# Patient Record
Sex: MALE | Race: WHITE | NOT HISPANIC OR LATINO | Employment: OTHER | URBAN - METROPOLITAN AREA
[De-identification: names, ages, dates, MRNs, and addresses within clinical notes are randomized per-mention and may not be internally consistent; named-entity substitution may affect disease eponyms.]

---

## 2023-01-01 ENCOUNTER — NURSING HOME VISIT (OUTPATIENT)
Dept: POST ACUTE CARE | Facility: EXTERNAL LOCATION | Age: 88
End: 2023-01-01
Payer: COMMERCIAL

## 2023-01-01 ENCOUNTER — OFFICE VISIT (OUTPATIENT)
Dept: DERMATOLOGY | Facility: CLINIC | Age: 88
End: 2023-01-01
Payer: COMMERCIAL

## 2023-01-01 ENCOUNTER — TELEPHONE (OUTPATIENT)
Dept: DERMATOLOGY | Facility: CLINIC | Age: 88
End: 2023-01-01
Payer: COMMERCIAL

## 2023-01-01 ENCOUNTER — APPOINTMENT (OUTPATIENT)
Dept: VASCULAR MEDICINE | Facility: HOSPITAL | Age: 88
DRG: 175 | End: 2023-01-01
Payer: COMMERCIAL

## 2023-01-01 ENCOUNTER — APPOINTMENT (OUTPATIENT)
Dept: HEMATOLOGY/ONCOLOGY | Facility: CLINIC | Age: 88
End: 2023-01-01
Payer: COMMERCIAL

## 2023-01-01 ENCOUNTER — APPOINTMENT (OUTPATIENT)
Dept: CARDIOLOGY | Facility: HOSPITAL | Age: 88
DRG: 175 | End: 2023-01-01
Payer: COMMERCIAL

## 2023-01-01 ENCOUNTER — HOSPITAL ENCOUNTER (INPATIENT)
Facility: HOSPITAL | Age: 88
LOS: 5 days | Discharge: NURSING, REHAB OR LONG-TERM CARE | DRG: 175 | End: 2023-12-19
Attending: EMERGENCY MEDICINE | Admitting: INTERNAL MEDICINE
Payer: COMMERCIAL

## 2023-01-01 ENCOUNTER — APPOINTMENT (OUTPATIENT)
Dept: RADIOLOGY | Facility: HOSPITAL | Age: 88
DRG: 175 | End: 2023-01-01
Payer: COMMERCIAL

## 2023-01-01 ENCOUNTER — NURSING HOME VISIT (OUTPATIENT)
Dept: POST ACUTE CARE | Facility: EXTERNAL LOCATION | Age: 88
End: 2023-01-01

## 2023-01-01 ENCOUNTER — APPOINTMENT (OUTPATIENT)
Dept: LAB | Facility: HOSPITAL | Age: 88
End: 2023-01-01
Payer: COMMERCIAL

## 2023-01-01 ENCOUNTER — OFFICE VISIT (OUTPATIENT)
Dept: NEPHROLOGY | Facility: CLINIC | Age: 88
End: 2023-01-01
Payer: COMMERCIAL

## 2023-01-01 ENCOUNTER — OFFICE VISIT (OUTPATIENT)
Dept: HEMATOLOGY/ONCOLOGY | Facility: CLINIC | Age: 88
End: 2023-01-01
Payer: COMMERCIAL

## 2023-01-01 VITALS
WEIGHT: 147.2 LBS | TEMPERATURE: 98.8 F | HEART RATE: 69 BPM | RESPIRATION RATE: 20 BRPM | SYSTOLIC BLOOD PRESSURE: 99 MMHG | BODY MASS INDEX: 21.07 KG/M2 | HEIGHT: 70 IN | OXYGEN SATURATION: 92 % | DIASTOLIC BLOOD PRESSURE: 57 MMHG

## 2023-01-01 VITALS
HEART RATE: 62 BPM | OXYGEN SATURATION: 96 % | DIASTOLIC BLOOD PRESSURE: 62 MMHG | SYSTOLIC BLOOD PRESSURE: 120 MMHG | TEMPERATURE: 98.1 F

## 2023-01-01 VITALS
HEIGHT: 67 IN | HEART RATE: 58 BPM | OXYGEN SATURATION: 94 % | DIASTOLIC BLOOD PRESSURE: 68 MMHG | BODY MASS INDEX: 24.33 KG/M2 | SYSTOLIC BLOOD PRESSURE: 146 MMHG | WEIGHT: 155 LBS | RESPIRATION RATE: 18 BRPM

## 2023-01-01 DIAGNOSIS — J44.9 CHRONIC OBSTRUCTIVE PULMONARY DISEASE, UNSPECIFIED COPD TYPE (MULTI): ICD-10-CM

## 2023-01-01 DIAGNOSIS — C43.59 MALIGNANT MELANOMA OF TORSO EXCLUDING BREAST (MULTI): ICD-10-CM

## 2023-01-01 DIAGNOSIS — N18.4 CKD (CHRONIC KIDNEY DISEASE) STAGE 4, GFR 15-29 ML/MIN (MULTI): ICD-10-CM

## 2023-01-01 DIAGNOSIS — L57.0 ACTINIC KERATOSIS: ICD-10-CM

## 2023-01-01 DIAGNOSIS — I26.99 ACUTE PULMONARY EMBOLISM, UNSPECIFIED PULMONARY EMBOLISM TYPE, UNSPECIFIED WHETHER ACUTE COR PULMONALE PRESENT (MULTI): ICD-10-CM

## 2023-01-01 DIAGNOSIS — I10 BENIGN HYPERTENSION: ICD-10-CM

## 2023-01-01 DIAGNOSIS — E46 PROTEIN-CALORIE MALNUTRITION, UNSPECIFIED SEVERITY (MULTI): ICD-10-CM

## 2023-01-01 DIAGNOSIS — E78.5 DYSLIPIDEMIA: ICD-10-CM

## 2023-01-01 DIAGNOSIS — L57.8 SUN-DAMAGED SKIN: ICD-10-CM

## 2023-01-01 DIAGNOSIS — N18.30 STAGE 3 CHRONIC KIDNEY DISEASE, UNSPECIFIED WHETHER STAGE 3A OR 3B CKD (MULTI): Primary | ICD-10-CM

## 2023-01-01 DIAGNOSIS — L82.1 SEBORRHEIC KERATOSIS: ICD-10-CM

## 2023-01-01 DIAGNOSIS — J44.1 CHRONIC OBSTRUCTIVE PULMONARY DISEASE WITH ACUTE EXACERBATION (MULTI): Primary | ICD-10-CM

## 2023-01-01 DIAGNOSIS — G25.81 RLS (RESTLESS LEGS SYNDROME): ICD-10-CM

## 2023-01-01 DIAGNOSIS — R06.02 SHORTNESS OF BREATH: ICD-10-CM

## 2023-01-01 DIAGNOSIS — I26.99 PULMONARY EMBOLISM, OTHER, UNSPECIFIED CHRONICITY, UNSPECIFIED WHETHER ACUTE COR PULMONALE PRESENT (MULTI): ICD-10-CM

## 2023-01-01 DIAGNOSIS — I50.43 ACUTE ON CHRONIC COMBINED SYSTOLIC AND DIASTOLIC CONGESTIVE HEART FAILURE (MULTI): ICD-10-CM

## 2023-01-01 DIAGNOSIS — K92.1 HEMATOCHEZIA: Primary | ICD-10-CM

## 2023-01-01 DIAGNOSIS — J96.90 RESPIRATORY FAILURE, UNSPECIFIED CHRONICITY, UNSPECIFIED WHETHER WITH HYPOXIA OR HYPERCAPNIA (MULTI): ICD-10-CM

## 2023-01-01 DIAGNOSIS — J44.0 CHRONIC OBSTRUCTIVE PULMONARY DISEASE WITH ACUTE LOWER RESPIRATORY INFECTION (MULTI): ICD-10-CM

## 2023-01-01 DIAGNOSIS — N17.0 ACUTE RENAL FAILURE WITH TUBULAR NECROSIS (CMS-HCC): ICD-10-CM

## 2023-01-01 DIAGNOSIS — I50.9 CONGESTIVE HEART FAILURE, UNSPECIFIED HF CHRONICITY, UNSPECIFIED HEART FAILURE TYPE (MULTI): ICD-10-CM

## 2023-01-01 DIAGNOSIS — Z86.39 HISTORY OF TYPE 2 DIABETES MELLITUS: ICD-10-CM

## 2023-01-01 DIAGNOSIS — J44.1 CHRONIC OBSTRUCTIVE PULMONARY DISEASE WITH ACUTE EXACERBATION (MULTI): ICD-10-CM

## 2023-01-01 DIAGNOSIS — R09.02 HYPOXIA: ICD-10-CM

## 2023-01-01 DIAGNOSIS — K21.9 GASTROESOPHAGEAL REFLUX DISEASE, UNSPECIFIED WHETHER ESOPHAGITIS PRESENT: ICD-10-CM

## 2023-01-01 DIAGNOSIS — J18.9 ACUTE PNEUMONIA: ICD-10-CM

## 2023-01-01 DIAGNOSIS — I89.0 LYMPHEDEMA: ICD-10-CM

## 2023-01-01 DIAGNOSIS — J18.9 HCAP (HEALTHCARE-ASSOCIATED PNEUMONIA): ICD-10-CM

## 2023-01-01 DIAGNOSIS — I50.20 SYSTOLIC CONGESTIVE HEART FAILURE, UNSPECIFIED HF CHRONICITY (MULTI): ICD-10-CM

## 2023-01-01 DIAGNOSIS — I50.9 HEART FAILURE, UNSPECIFIED HF CHRONICITY, UNSPECIFIED HEART FAILURE TYPE (MULTI): ICD-10-CM

## 2023-01-01 DIAGNOSIS — I50.41 ACUTE COMBINED SYSTOLIC (CONGESTIVE) AND DIASTOLIC (CONGESTIVE) HEART FAILURE (MULTI): ICD-10-CM

## 2023-01-01 DIAGNOSIS — M79.89 LEG SWELLING: ICD-10-CM

## 2023-01-01 DIAGNOSIS — D22.9 BENIGN NEVUS: ICD-10-CM

## 2023-01-01 DIAGNOSIS — C43.59 MALIGNANT MELANOMA OF TORSO EXCLUDING BREAST (MULTI): Primary | ICD-10-CM

## 2023-01-01 DIAGNOSIS — C43.9 MALIGNANT MELANOMA, UNSPECIFIED SITE (MULTI): ICD-10-CM

## 2023-01-01 DIAGNOSIS — D48.5 NEOPLASM OF UNCERTAIN BEHAVIOR OF SKIN: Primary | ICD-10-CM

## 2023-01-01 DIAGNOSIS — D18.01 ANGIOMA OF SKIN: ICD-10-CM

## 2023-01-01 DIAGNOSIS — Z85.820 PERSONAL HISTORY OF MALIGNANT MELANOMA OF SKIN: ICD-10-CM

## 2023-01-01 DIAGNOSIS — Z86.79 HISTORY OF HEART FAILURE: Primary | ICD-10-CM

## 2023-01-01 DIAGNOSIS — R63.4 WEIGHT LOSS: ICD-10-CM

## 2023-01-01 DIAGNOSIS — I50.9 CHRONIC HEART FAILURE, UNSPECIFIED HEART FAILURE TYPE (MULTI): ICD-10-CM

## 2023-01-01 LAB
ALBUMIN SERPL BCP-MCNC: 3.8 G/DL (ref 3.4–5)
ALP SERPL-CCNC: 68 U/L (ref 33–136)
ALT SERPL W P-5'-P-CCNC: 7 U/L (ref 10–52)
ANION GAP BLDV CALCULATED.4IONS-SCNC: 3 MMOL/L (ref 10–25)
ANION GAP SERPL CALC-SCNC: 10 MMOL/L (ref 10–20)
ANION GAP SERPL CALC-SCNC: 10 MMOL/L (ref 10–20)
ANION GAP SERPL CALC-SCNC: 11 MMOL/L (ref 10–20)
ANION GAP SERPL CALC-SCNC: 8 MMOL/L (ref 10–20)
ANION GAP SERPL CALC-SCNC: 9 MMOL/L (ref 10–20)
ANION GAP SERPL CALC-SCNC: 9 MMOL/L (ref 10–20)
AORTIC VALVE MEAN GRADIENT: 15.9
AORTIC VALVE PEAK VELOCITY: 2.63
APPEARANCE UR: CLEAR
AST SERPL W P-5'-P-CCNC: 14 U/L (ref 9–39)
AV PEAK GRADIENT: 27.7
AVA (PEAK VEL): 1.04
AVA (VTI): 1.21
BACTERIA #/AREA URNS AUTO: ABNORMAL /HPF
BACTERIA BLD CULT: NORMAL
BACTERIA BLD CULT: NORMAL
BACTERIA SPEC RESP CULT: NORMAL
BASE EXCESS BLDV CALC-SCNC: 6.7 MMOL/L (ref -2–3)
BASOPHILS # BLD AUTO: 0 X10*3/UL (ref 0–0.1)
BASOPHILS # BLD AUTO: 0 X10*3/UL (ref 0–0.1)
BASOPHILS # BLD AUTO: 0.01 X10*3/UL (ref 0–0.1)
BASOPHILS # BLD AUTO: 0.01 X10*3/UL (ref 0–0.1)
BASOPHILS # BLD AUTO: 0.03 X10*3/UL (ref 0–0.1)
BASOPHILS NFR BLD AUTO: 0 %
BASOPHILS NFR BLD AUTO: 0 %
BASOPHILS NFR BLD AUTO: 0.1 %
BASOPHILS NFR BLD AUTO: 0.1 %
BASOPHILS NFR BLD AUTO: 0.4 %
BILIRUB SERPL-MCNC: 0.6 MG/DL (ref 0–1.2)
BILIRUB UR STRIP.AUTO-MCNC: NEGATIVE MG/DL
BNP SERPL-MCNC: 845 PG/ML (ref 0–99)
BNP SERPL-MCNC: 915 PG/ML (ref 0–99)
BODY TEMPERATURE: ABNORMAL
BUN SERPL-MCNC: 36 MG/DL (ref 6–23)
BUN SERPL-MCNC: 36 MG/DL (ref 6–23)
BUN SERPL-MCNC: 45 MG/DL (ref 6–23)
BUN SERPL-MCNC: 48 MG/DL (ref 6–23)
BUN SERPL-MCNC: 50 MG/DL (ref 6–23)
BUN SERPL-MCNC: 52 MG/DL (ref 6–23)
CA-I BLDV-SCNC: 1.14 MMOL/L (ref 1.1–1.33)
CALCIUM SERPL-MCNC: 8.6 MG/DL (ref 8.6–10.3)
CALCIUM SERPL-MCNC: 8.7 MG/DL (ref 8.6–10.3)
CARDIAC TROPONIN I PNL SERPL HS: 46 NG/L (ref 0–20)
CARDIAC TROPONIN I PNL SERPL HS: 49 NG/L (ref 0–20)
CHLORIDE BLDV-SCNC: 108 MMOL/L (ref 98–107)
CHLORIDE SERPL-SCNC: 102 MMOL/L (ref 98–107)
CHLORIDE SERPL-SCNC: 102 MMOL/L (ref 98–107)
CHLORIDE SERPL-SCNC: 103 MMOL/L (ref 98–107)
CHLORIDE SERPL-SCNC: 105 MMOL/L (ref 98–107)
CHLORIDE SERPL-SCNC: 106 MMOL/L (ref 98–107)
CHLORIDE SERPL-SCNC: 97 MMOL/L (ref 98–107)
CO2 SERPL-SCNC: 31 MMOL/L (ref 21–32)
CO2 SERPL-SCNC: 33 MMOL/L (ref 21–32)
CO2 SERPL-SCNC: 33 MMOL/L (ref 21–32)
CO2 SERPL-SCNC: 34 MMOL/L (ref 21–32)
CO2 SERPL-SCNC: 36 MMOL/L (ref 21–32)
CO2 SERPL-SCNC: 39 MMOL/L (ref 21–32)
COLOR UR: YELLOW
CREAT SERPL-MCNC: 1.33 MG/DL (ref 0.5–1.3)
CREAT SERPL-MCNC: 1.61 MG/DL (ref 0.5–1.3)
CREAT SERPL-MCNC: 1.63 MG/DL (ref 0.5–1.3)
CREAT SERPL-MCNC: 1.75 MG/DL (ref 0.5–1.3)
CREAT SERPL-MCNC: 1.94 MG/DL (ref 0.5–1.3)
CREAT SERPL-MCNC: 1.96 MG/DL (ref 0.5–1.3)
D DIMER PPP FEU-MCNC: 1466 NG/ML FEU
EJECTION FRACTION APICAL 4 CHAMBER: 51.4
EJECTION FRACTION: 54
EOSINOPHIL # BLD AUTO: 0 X10*3/UL (ref 0–0.4)
EOSINOPHIL # BLD AUTO: 0.01 X10*3/UL (ref 0–0.4)
EOSINOPHIL # BLD AUTO: 0.11 X10*3/UL (ref 0–0.4)
EOSINOPHIL NFR BLD AUTO: 0 %
EOSINOPHIL NFR BLD AUTO: 0.1 %
EOSINOPHIL NFR BLD AUTO: 1.6 %
ERYTHROCYTE [DISTWIDTH] IN BLOOD BY AUTOMATED COUNT: 14 % (ref 11.5–14.5)
ERYTHROCYTE [DISTWIDTH] IN BLOOD BY AUTOMATED COUNT: 14.6 % (ref 11.5–14.5)
ERYTHROCYTE [DISTWIDTH] IN BLOOD BY AUTOMATED COUNT: 14.7 % (ref 11.5–14.5)
ERYTHROCYTE [DISTWIDTH] IN BLOOD BY AUTOMATED COUNT: 14.8 % (ref 11.5–14.5)
FERRITIN SERPL-MCNC: 47 NG/ML (ref 20–300)
FLUAV RNA RESP QL NAA+PROBE: NOT DETECTED
FLUBV RNA RESP QL NAA+PROBE: NOT DETECTED
GFR SERPL CREATININE-BSD FRML MDRD: 31 ML/MIN/1.73M*2
GFR SERPL CREATININE-BSD FRML MDRD: 31 ML/MIN/1.73M*2
GFR SERPL CREATININE-BSD FRML MDRD: 35 ML/MIN/1.73M*2
GFR SERPL CREATININE-BSD FRML MDRD: 39 ML/MIN/1.73M*2
GFR SERPL CREATININE-BSD FRML MDRD: 39 ML/MIN/1.73M*2
GFR SERPL CREATININE-BSD FRML MDRD: 49 ML/MIN/1.73M*2
GLUCOSE BLDV-MCNC: 143 MG/DL (ref 74–99)
GLUCOSE SERPL-MCNC: 109 MG/DL (ref 74–99)
GLUCOSE SERPL-MCNC: 133 MG/DL (ref 74–99)
GLUCOSE SERPL-MCNC: 149 MG/DL (ref 74–99)
GLUCOSE SERPL-MCNC: 171 MG/DL (ref 74–99)
GLUCOSE SERPL-MCNC: 180 MG/DL (ref 74–99)
GLUCOSE SERPL-MCNC: 181 MG/DL (ref 74–99)
GLUCOSE UR STRIP.AUTO-MCNC: NEGATIVE MG/DL
GRAM STN SPEC: NORMAL
GRAM STN SPEC: NORMAL
HCO3 BLDV-SCNC: 32.8 MMOL/L (ref 22–26)
HCT VFR BLD AUTO: 33.7 % (ref 41–52)
HCT VFR BLD AUTO: 35 % (ref 41–52)
HCT VFR BLD AUTO: 37 % (ref 41–52)
HCT VFR BLD AUTO: 38.1 % (ref 41–52)
HCT VFR BLD AUTO: 38.2 % (ref 41–52)
HCT VFR BLD AUTO: 39.3 % (ref 41–52)
HCT VFR BLD AUTO: 41.1 % (ref 41–52)
HCT VFR BLD EST: 34 % (ref 41–52)
HGB BLD-MCNC: 10.5 G/DL (ref 13.5–17.5)
HGB BLD-MCNC: 11.1 G/DL (ref 13.5–17.5)
HGB BLD-MCNC: 11.2 G/DL (ref 13.5–17.5)
HGB BLD-MCNC: 11.6 G/DL (ref 13.5–17.5)
HGB BLD-MCNC: 11.6 G/DL (ref 13.5–17.5)
HGB BLD-MCNC: 12.1 G/DL (ref 13.5–17.5)
HGB BLD-MCNC: 9.9 G/DL (ref 13.5–17.5)
HGB BLDV-MCNC: 11.2 G/DL (ref 13.5–17.5)
HOLD SPECIMEN: NORMAL
HOLD SPECIMEN: NORMAL
IMM GRANULOCYTES # BLD AUTO: 0 X10*3/UL (ref 0–0.5)
IMM GRANULOCYTES # BLD AUTO: 0.02 X10*3/UL (ref 0–0.5)
IMM GRANULOCYTES # BLD AUTO: 0.03 X10*3/UL (ref 0–0.5)
IMM GRANULOCYTES NFR BLD AUTO: 0 % (ref 0–0.9)
IMM GRANULOCYTES NFR BLD AUTO: 0.3 % (ref 0–0.9)
IMM GRANULOCYTES NFR BLD AUTO: 0.3 % (ref 0–0.9)
IMM GRANULOCYTES NFR BLD AUTO: 0.4 % (ref 0–0.9)
IMM GRANULOCYTES NFR BLD AUTO: 0.5 % (ref 0–0.9)
INHALED O2 CONCENTRATION: 21 %
IRON SATN MFR SERPL: 20 % (ref 25–45)
IRON SATN MFR SERPL: 5 % (ref 25–45)
IRON SERPL-MCNC: 14 UG/DL (ref 35–150)
IRON SERPL-MCNC: 58 UG/DL (ref 35–150)
KETONES UR STRIP.AUTO-MCNC: NEGATIVE MG/DL
LABORATORY COMMENT REPORT: NORMAL
LACTATE BLDV-SCNC: 1.1 MMOL/L (ref 0.4–2)
LACTATE SERPL-SCNC: 0.9 MMOL/L (ref 0.4–2)
LEFT ATRIUM VOLUME AREA LENGTH INDEX BSA: 48.1
LEFT VENTRICLE INTERNAL DIMENSION DIASTOLE: 4.46 (ref 3.5–6)
LEFT VENTRICULAR OUTFLOW TRACT DIAMETER: 1.98
LEUKOCYTE ESTERASE UR QL STRIP.AUTO: NEGATIVE
LYMPHOCYTES # BLD AUTO: 1 X10*3/UL (ref 0.8–3)
LYMPHOCYTES # BLD AUTO: 1.84 X10*3/UL (ref 0.8–3)
LYMPHOCYTES # BLD AUTO: 2.14 X10*3/UL (ref 0.8–3)
LYMPHOCYTES # BLD AUTO: 2.79 X10*3/UL (ref 0.8–3)
LYMPHOCYTES # BLD AUTO: 2.96 X10*3/UL (ref 0.8–3)
LYMPHOCYTES NFR BLD AUTO: 24.9 %
LYMPHOCYTES NFR BLD AUTO: 26.9 %
LYMPHOCYTES NFR BLD AUTO: 28.4 %
LYMPHOCYTES NFR BLD AUTO: 40.6 %
LYMPHOCYTES NFR BLD AUTO: 40.7 %
MAGNESIUM SERPL-MCNC: 2.21 MG/DL (ref 1.6–2.4)
MAGNESIUM SERPL-MCNC: 2.3 MG/DL (ref 1.6–2.4)
MAGNESIUM SERPL-MCNC: 2.3 MG/DL (ref 1.6–2.4)
MCH RBC QN AUTO: 26.6 PG (ref 26–34)
MCH RBC QN AUTO: 26.9 PG (ref 26–34)
MCH RBC QN AUTO: 27.1 PG (ref 26–34)
MCH RBC QN AUTO: 27.3 PG (ref 26–34)
MCH RBC QN AUTO: 27.6 PG (ref 26–34)
MCH RBC QN AUTO: 27.7 PG (ref 26–34)
MCH RBC QN AUTO: 27.8 PG (ref 26–34)
MCHC RBC AUTO-ENTMCNC: 29.4 G/DL (ref 32–36)
MCHC RBC AUTO-ENTMCNC: 29.5 G/DL (ref 32–36)
MCHC RBC AUTO-ENTMCNC: 30 G/DL (ref 32–36)
MCHC RBC AUTO-ENTMCNC: 30 G/DL (ref 32–36)
MCHC RBC AUTO-ENTMCNC: 30.4 G/DL (ref 32–36)
MCV RBC AUTO: 89 FL (ref 80–100)
MCV RBC AUTO: 90 FL (ref 80–100)
MCV RBC AUTO: 90 FL (ref 80–100)
MCV RBC AUTO: 92 FL (ref 80–100)
MCV RBC AUTO: 93 FL (ref 80–100)
MCV RBC AUTO: 94 FL (ref 80–100)
MCV RBC AUTO: 95 FL (ref 80–100)
MITRAL VALVE E/A RATIO: 1.07
MITRAL VALVE E/E' RATIO: 18.44
MONOCYTES # BLD AUTO: 0.07 X10*3/UL (ref 0.05–0.8)
MONOCYTES # BLD AUTO: 0.44 X10*3/UL (ref 0.05–0.8)
MONOCYTES # BLD AUTO: 0.51 X10*3/UL (ref 0.05–0.8)
MONOCYTES # BLD AUTO: 0.53 X10*3/UL (ref 0.05–0.8)
MONOCYTES # BLD AUTO: 0.6 X10*3/UL (ref 0.05–0.8)
MONOCYTES NFR BLD AUTO: 1.7 %
MONOCYTES NFR BLD AUTO: 5.5 %
MONOCYTES NFR BLD AUTO: 7.3 %
MONOCYTES NFR BLD AUTO: 7.4 %
MONOCYTES NFR BLD AUTO: 9.3 %
MRSA DNA SPEC QL NAA+PROBE: NOT DETECTED
NEUTROPHILS # BLD AUTO: 2.92 X10*3/UL (ref 1.6–5.5)
NEUTROPHILS # BLD AUTO: 3.44 X10*3/UL (ref 1.6–5.5)
NEUTROPHILS # BLD AUTO: 3.75 X10*3/UL (ref 1.6–5.5)
NEUTROPHILS # BLD AUTO: 4.01 X10*3/UL (ref 1.6–5.5)
NEUTROPHILS # BLD AUTO: 5.35 X10*3/UL (ref 1.6–5.5)
NEUTROPHILS NFR BLD AUTO: 50 %
NEUTROPHILS NFR BLD AUTO: 51.5 %
NEUTROPHILS NFR BLD AUTO: 62 %
NEUTROPHILS NFR BLD AUTO: 67.1 %
NEUTROPHILS NFR BLD AUTO: 72.9 %
NITRITE UR QL STRIP.AUTO: NEGATIVE
NRBC BLD-RTO: 0 /100 WBCS (ref 0–0)
NRBC BLD-RTO: 0.3 /100 WBCS (ref 0–0)
OXYHGB MFR BLDV: 59.5 % (ref 45–75)
PATH REPORT.FINAL DX SPEC: NORMAL
PATH REPORT.GROSS SPEC: NORMAL
PATH REPORT.MICROSCOPIC SPEC OTHER STN: NORMAL
PATH REPORT.RELEVANT HX SPEC: NORMAL
PATH REPORT.TOTAL CANCER: NORMAL
PCO2 BLDV: 53 MM HG (ref 41–51)
PH BLDV: 7.4 PH (ref 7.33–7.43)
PH UR STRIP.AUTO: 5 [PH]
PLATELET # BLD AUTO: 105 X10*3/UL (ref 150–450)
PLATELET # BLD AUTO: 75 X10*3/UL (ref 150–450)
PLATELET # BLD AUTO: 76 X10*3/UL (ref 150–450)
PLATELET # BLD AUTO: 83 X10*3/UL (ref 150–450)
PLATELET # BLD AUTO: 97 X10*3/UL (ref 150–450)
PLATELET # BLD AUTO: 97 X10*3/UL (ref 150–450)
PLATELET # BLD AUTO: 99 X10*3/UL (ref 150–450)
PO2 BLDV: 39 MM HG (ref 35–45)
POTASSIUM BLDV-SCNC: 4.7 MMOL/L (ref 3.5–5.3)
POTASSIUM SERPL-SCNC: 4.2 MMOL/L (ref 3.5–5.3)
POTASSIUM SERPL-SCNC: 4.3 MMOL/L (ref 3.5–5.3)
POTASSIUM SERPL-SCNC: 4.6 MMOL/L (ref 3.5–5.3)
POTASSIUM SERPL-SCNC: 4.8 MMOL/L (ref 3.5–5.3)
POTASSIUM SERPL-SCNC: 4.8 MMOL/L (ref 3.5–5.3)
POTASSIUM SERPL-SCNC: 4.9 MMOL/L (ref 3.5–5.3)
PROCALCITONIN SERPL-MCNC: 0.31 NG/ML
PROT SERPL-MCNC: 6.1 G/DL (ref 6.4–8.2)
PROT UR STRIP.AUTO-MCNC: ABNORMAL MG/DL
RBC # BLD AUTO: 3.59 X10*6/UL (ref 4.5–5.9)
RBC # BLD AUTO: 3.79 X10*6/UL (ref 4.5–5.9)
RBC # BLD AUTO: 4.1 X10*6/UL (ref 4.5–5.9)
RBC # BLD AUTO: 4.1 X10*6/UL (ref 4.5–5.9)
RBC # BLD AUTO: 4.31 X10*6/UL (ref 4.5–5.9)
RBC # BLD AUTO: 4.35 X10*6/UL (ref 4.5–5.9)
RBC # BLD AUTO: 4.36 X10*6/UL (ref 4.5–5.9)
RBC # UR STRIP.AUTO: NEGATIVE /UL
RBC #/AREA URNS AUTO: ABNORMAL /HPF
RIGHT VENTRICLE FREE WALL PEAK S': 9
RIGHT VENTRICLE PEAK SYSTOLIC PRESSURE: 46.6
SAO2 % BLDV: 61 % (ref 45–75)
SARS-COV-2 RNA RESP QL NAA+PROBE: NOT DETECTED
SODIUM BLDV-SCNC: 139 MMOL/L (ref 136–145)
SODIUM SERPL-SCNC: 140 MMOL/L (ref 136–145)
SODIUM SERPL-SCNC: 141 MMOL/L (ref 136–145)
SODIUM SERPL-SCNC: 142 MMOL/L (ref 136–145)
SODIUM SERPL-SCNC: 143 MMOL/L (ref 136–145)
SP GR UR STRIP.AUTO: 1.02
STAPHYLOCOCCUS SPEC CULT: NORMAL
TIBC SERPL-MCNC: 264 UG/DL (ref 240–445)
TIBC SERPL-MCNC: 293 UG/DL (ref 240–445)
TRICUSPID ANNULAR PLANE SYSTOLIC EXCURSION: 1.9
UFH PPP CHRO-ACNC: 0.2 IU/ML
UFH PPP CHRO-ACNC: 0.3 IU/ML
UFH PPP CHRO-ACNC: 0.4 IU/ML
UIBC SERPL-MCNC: 235 UG/DL (ref 110–370)
UIBC SERPL-MCNC: 250 UG/DL (ref 110–370)
UROBILINOGEN UR STRIP.AUTO-MCNC: <2 MG/DL
VIT B12 SERPL-MCNC: 391 PG/ML (ref 211–911)
WBC # BLD AUTO: 4 X10*3/UL (ref 4.4–11.3)
WBC # BLD AUTO: 6.5 X10*3/UL (ref 4.4–11.3)
WBC # BLD AUTO: 6.9 X10*3/UL (ref 4.4–11.3)
WBC # BLD AUTO: 7.3 X10*3/UL (ref 4.4–11.3)
WBC # BLD AUTO: 7.3 X10*3/UL (ref 4.4–11.3)
WBC # BLD AUTO: 7.9 X10*3/UL (ref 4.4–11.3)
WBC # BLD AUTO: 8 X10*3/UL (ref 4.4–11.3)
WBC #/AREA URNS AUTO: ABNORMAL /HPF

## 2023-01-01 PROCEDURE — 83880 ASSAY OF NATRIURETIC PEPTIDE: CPT | Performed by: INTERNAL MEDICINE

## 2023-01-01 PROCEDURE — 36415 COLL VENOUS BLD VENIPUNCTURE: CPT | Performed by: INTERNAL MEDICINE

## 2023-01-01 PROCEDURE — 36415 COLL VENOUS BLD VENIPUNCTURE: CPT | Performed by: NURSE PRACTITIONER

## 2023-01-01 PROCEDURE — 1126F AMNT PAIN NOTED NONE PRSNT: CPT | Performed by: INTERNAL MEDICINE

## 2023-01-01 PROCEDURE — 99308 SBSQ NF CARE LOW MDM 20: CPT | Performed by: FAMILY MEDICINE

## 2023-01-01 PROCEDURE — 83540 ASSAY OF IRON: CPT | Performed by: INTERNAL MEDICINE

## 2023-01-01 PROCEDURE — 1210000001 HC SEMI-PRIVATE ROOM DAILY

## 2023-01-01 PROCEDURE — 99309 SBSQ NF CARE MODERATE MDM 30: CPT | Performed by: FAMILY MEDICINE

## 2023-01-01 PROCEDURE — 71045 X-RAY EXAM CHEST 1 VIEW: CPT

## 2023-01-01 PROCEDURE — 2500000004 HC RX 250 GENERAL PHARMACY W/ HCPCS (ALT 636 FOR OP/ED): Performed by: INTERNAL MEDICINE

## 2023-01-01 PROCEDURE — 3078F DIAST BP <80 MM HG: CPT | Performed by: NURSE PRACTITIONER

## 2023-01-01 PROCEDURE — 94660 CPAP INITIATION&MGMT: CPT

## 2023-01-01 PROCEDURE — 96367 TX/PROPH/DG ADDL SEQ IV INF: CPT

## 2023-01-01 PROCEDURE — 96365 THER/PROPH/DIAG IV INF INIT: CPT | Mod: 59

## 2023-01-01 PROCEDURE — 85520 HEPARIN ASSAY: CPT | Performed by: INTERNAL MEDICINE

## 2023-01-01 PROCEDURE — 80048 BASIC METABOLIC PNL TOTAL CA: CPT | Performed by: INTERNAL MEDICINE

## 2023-01-01 PROCEDURE — 83880 ASSAY OF NATRIURETIC PEPTIDE: CPT | Performed by: EMERGENCY MEDICINE

## 2023-01-01 PROCEDURE — 2500000001 HC RX 250 WO HCPCS SELF ADMINISTERED DRUGS (ALT 637 FOR MEDICARE OP): Performed by: INTERNAL MEDICINE

## 2023-01-01 PROCEDURE — 2500000004 HC RX 250 GENERAL PHARMACY W/ HCPCS (ALT 636 FOR OP/ED): Performed by: NURSE PRACTITIONER

## 2023-01-01 PROCEDURE — 2500000002 HC RX 250 W HCPCS SELF ADMINISTERED DRUGS (ALT 637 FOR MEDICARE OP, ALT 636 FOR OP/ED): Performed by: INTERNAL MEDICINE

## 2023-01-01 PROCEDURE — 99239 HOSP IP/OBS DSCHRG MGMT >30: CPT | Performed by: FAMILY MEDICINE

## 2023-01-01 PROCEDURE — A9540 TC99M MAA: HCPCS | Performed by: INTERNAL MEDICINE

## 2023-01-01 PROCEDURE — 85025 COMPLETE CBC W/AUTO DIFF WBC: CPT | Performed by: INTERNAL MEDICINE

## 2023-01-01 PROCEDURE — 1126F AMNT PAIN NOTED NONE PRSNT: CPT | Performed by: NURSE PRACTITIONER

## 2023-01-01 PROCEDURE — 85027 COMPLETE CBC AUTOMATED: CPT | Performed by: NURSE PRACTITIONER

## 2023-01-01 PROCEDURE — 94668 MNPJ CHEST WALL SBSQ: CPT

## 2023-01-01 PROCEDURE — 94640 AIRWAY INHALATION TREATMENT: CPT

## 2023-01-01 PROCEDURE — 84155 ASSAY OF PROTEIN SERUM: CPT | Performed by: NURSE PRACTITIONER

## 2023-01-01 PROCEDURE — 85025 COMPLETE CBC W/AUTO DIFF WBC: CPT | Performed by: EMERGENCY MEDICINE

## 2023-01-01 PROCEDURE — 99233 SBSQ HOSP IP/OBS HIGH 50: CPT | Performed by: INTERNAL MEDICINE

## 2023-01-01 PROCEDURE — 82607 VITAMIN B-12: CPT | Performed by: NURSE PRACTITIONER

## 2023-01-01 PROCEDURE — 85025 COMPLETE CBC W/AUTO DIFF WBC: CPT | Performed by: NURSE PRACTITIONER

## 2023-01-01 PROCEDURE — 2500000002 HC RX 250 W HCPCS SELF ADMINISTERED DRUGS (ALT 637 FOR MEDICARE OP, ALT 636 FOR OP/ED): Performed by: EMERGENCY MEDICINE

## 2023-01-01 PROCEDURE — 93005 ELECTROCARDIOGRAM TRACING: CPT

## 2023-01-01 PROCEDURE — 84484 ASSAY OF TROPONIN QUANT: CPT | Performed by: EMERGENCY MEDICINE

## 2023-01-01 PROCEDURE — 11102 TANGNTL BX SKIN SINGLE LES: CPT | Performed by: NURSE PRACTITIONER

## 2023-01-01 PROCEDURE — 1100000001 HC PRIVATE ROOM DAILY

## 2023-01-01 PROCEDURE — 85520 HEPARIN ASSAY: CPT | Performed by: NURSE PRACTITIONER

## 2023-01-01 PROCEDURE — 99309 SBSQ NF CARE MODERATE MDM 30: CPT | Performed by: NURSE PRACTITIONER

## 2023-01-01 PROCEDURE — 82728 ASSAY OF FERRITIN: CPT | Performed by: NURSE PRACTITIONER

## 2023-01-01 PROCEDURE — 87205 SMEAR GRAM STAIN: CPT | Mod: GEALAB | Performed by: INTERNAL MEDICINE

## 2023-01-01 PROCEDURE — 99214 OFFICE O/P EST MOD 30 MIN: CPT | Mod: 25 | Performed by: NURSE PRACTITIONER

## 2023-01-01 PROCEDURE — 99223 1ST HOSP IP/OBS HIGH 75: CPT | Performed by: INTERNAL MEDICINE

## 2023-01-01 PROCEDURE — 83735 ASSAY OF MAGNESIUM: CPT | Performed by: INTERNAL MEDICINE

## 2023-01-01 PROCEDURE — 1159F MED LIST DOCD IN RCRD: CPT | Performed by: INTERNAL MEDICINE

## 2023-01-01 PROCEDURE — 99305 1ST NF CARE MODERATE MDM 35: CPT | Performed by: FAMILY MEDICINE

## 2023-01-01 PROCEDURE — 2500000004 HC RX 250 GENERAL PHARMACY W/ HCPCS (ALT 636 FOR OP/ED): Performed by: EMERGENCY MEDICINE

## 2023-01-01 PROCEDURE — 2500000005 HC RX 250 GENERAL PHARMACY W/O HCPCS: Performed by: INTERNAL MEDICINE

## 2023-01-01 PROCEDURE — 99213 OFFICE O/P EST LOW 20 MIN: CPT | Performed by: NURSE PRACTITIONER

## 2023-01-01 PROCEDURE — 78580 LUNG PERFUSION IMAGING: CPT

## 2023-01-01 PROCEDURE — 78830 RP LOCLZJ TUM SPECT W/CT 1: CPT | Performed by: RADIOLOGY

## 2023-01-01 PROCEDURE — 71045 X-RAY EXAM CHEST 1 VIEW: CPT | Mod: FY

## 2023-01-01 PROCEDURE — 84145 PROCALCITONIN (PCT): CPT | Mod: GEALAB | Performed by: INTERNAL MEDICINE

## 2023-01-01 PROCEDURE — 36415 COLL VENOUS BLD VENIPUNCTURE: CPT | Performed by: EMERGENCY MEDICINE

## 2023-01-01 PROCEDURE — 93306 TTE W/DOPPLER COMPLETE: CPT | Performed by: INTERNAL MEDICINE

## 2023-01-01 PROCEDURE — 81001 URINALYSIS AUTO W/SCOPE: CPT | Performed by: EMERGENCY MEDICINE

## 2023-01-01 PROCEDURE — 94760 N-INVAS EAR/PLS OXIMETRY 1: CPT

## 2023-01-01 PROCEDURE — 84132 ASSAY OF SERUM POTASSIUM: CPT | Performed by: EMERGENCY MEDICINE

## 2023-01-01 PROCEDURE — 2500000005 HC RX 250 GENERAL PHARMACY W/O HCPCS: Performed by: EMERGENCY MEDICINE

## 2023-01-01 PROCEDURE — 3430000001 HC RX 343 DIAGNOSTIC RADIOPHARMACEUTICALS: Performed by: INTERNAL MEDICINE

## 2023-01-01 PROCEDURE — 94667 MNPJ CHEST WALL 1ST: CPT

## 2023-01-01 PROCEDURE — 3077F SYST BP >= 140 MM HG: CPT | Performed by: INTERNAL MEDICINE

## 2023-01-01 PROCEDURE — 99285 EMERGENCY DEPT VISIT HI MDM: CPT | Performed by: EMERGENCY MEDICINE

## 2023-01-01 PROCEDURE — 1159F MED LIST DOCD IN RCRD: CPT | Performed by: NURSE PRACTITIONER

## 2023-01-01 PROCEDURE — 93306 TTE W/DOPPLER COMPLETE: CPT

## 2023-01-01 PROCEDURE — 11103 TANGNTL BX SKIN EA SEP/ADDL: CPT | Performed by: NURSE PRACTITIONER

## 2023-01-01 PROCEDURE — 99214 OFFICE O/P EST MOD 30 MIN: CPT | Performed by: NURSE PRACTITIONER

## 2023-01-01 PROCEDURE — 87641 MR-STAPH DNA AMP PROBE: CPT | Performed by: EMERGENCY MEDICINE

## 2023-01-01 PROCEDURE — 87636 SARSCOV2 & INF A&B AMP PRB: CPT | Performed by: EMERGENCY MEDICINE

## 2023-01-01 PROCEDURE — 96361 HYDRATE IV INFUSION ADD-ON: CPT

## 2023-01-01 PROCEDURE — 87081 CULTURE SCREEN ONLY: CPT | Mod: GEALAB | Performed by: INTERNAL MEDICINE

## 2023-01-01 PROCEDURE — 83540 ASSAY OF IRON: CPT | Performed by: NURSE PRACTITIONER

## 2023-01-01 PROCEDURE — 99215 OFFICE O/P EST HI 40 MIN: CPT | Performed by: INTERNAL MEDICINE

## 2023-01-01 PROCEDURE — 94664 DEMO&/EVAL PT USE INHALER: CPT

## 2023-01-01 PROCEDURE — 3074F SYST BP LT 130 MM HG: CPT | Performed by: NURSE PRACTITIONER

## 2023-01-01 PROCEDURE — 83605 ASSAY OF LACTIC ACID: CPT | Performed by: EMERGENCY MEDICINE

## 2023-01-01 PROCEDURE — 96375 TX/PRO/DX INJ NEW DRUG ADDON: CPT

## 2023-01-01 PROCEDURE — 82330 ASSAY OF CALCIUM: CPT | Performed by: EMERGENCY MEDICINE

## 2023-01-01 PROCEDURE — 71045 X-RAY EXAM CHEST 1 VIEW: CPT | Performed by: RADIOLOGY

## 2023-01-01 PROCEDURE — 85379 FIBRIN DEGRADATION QUANT: CPT | Performed by: INTERNAL MEDICINE

## 2023-01-01 PROCEDURE — 99232 SBSQ HOSP IP/OBS MODERATE 35: CPT | Performed by: INTERNAL MEDICINE

## 2023-01-01 PROCEDURE — 3078F DIAST BP <80 MM HG: CPT | Performed by: INTERNAL MEDICINE

## 2023-01-01 PROCEDURE — 93970 EXTREMITY STUDY: CPT | Performed by: INTERNAL MEDICINE

## 2023-01-01 PROCEDURE — 96372 THER/PROPH/DIAG INJ SC/IM: CPT | Performed by: INTERNAL MEDICINE

## 2023-01-01 PROCEDURE — 99232 SBSQ HOSP IP/OBS MODERATE 35: CPT | Performed by: NURSE PRACTITIONER

## 2023-01-01 PROCEDURE — 88305 TISSUE EXAM BY PATHOLOGIST: CPT | Performed by: DERMATOLOGY

## 2023-01-01 PROCEDURE — 93970 EXTREMITY STUDY: CPT

## 2023-01-01 PROCEDURE — 82435 ASSAY OF BLOOD CHLORIDE: CPT | Performed by: INTERNAL MEDICINE

## 2023-01-01 PROCEDURE — 88305 TISSUE EXAM BY PATHOLOGIST: CPT | Mod: TC,DER | Performed by: NURSE PRACTITIONER

## 2023-01-01 PROCEDURE — 99310 SBSQ NF CARE HIGH MDM 45: CPT | Performed by: NURSE PRACTITIONER

## 2023-01-01 PROCEDURE — 99310 SBSQ NF CARE HIGH MDM 45: CPT | Performed by: FAMILY MEDICINE

## 2023-01-01 PROCEDURE — 87040 BLOOD CULTURE FOR BACTERIA: CPT | Mod: GEALAB | Performed by: EMERGENCY MEDICINE

## 2023-01-01 RX ORDER — IBUPROFEN 200 MG
TABLET ORAL
COMMUNITY
Start: 2022-02-04 | End: 2023-01-01 | Stop reason: ENTERED-IN-ERROR

## 2023-01-01 RX ORDER — ADHESIVE BANDAGE
30 BANDAGE TOPICAL NIGHTLY PRN
COMMUNITY
End: 2023-01-01 | Stop reason: ENTERED-IN-ERROR

## 2023-01-01 RX ORDER — AMMONIUM LACTATE 12 G/100G
1 CREAM TOPICAL 2 TIMES DAILY
COMMUNITY

## 2023-01-01 RX ORDER — PANTOPRAZOLE SODIUM 40 MG/1
40 TABLET, DELAYED RELEASE ORAL DAILY
Status: DISCONTINUED | OUTPATIENT
Start: 2023-01-01 | End: 2023-01-01 | Stop reason: HOSPADM

## 2023-01-01 RX ORDER — AZITHROMYCIN 500 MG/1
500 TABLET, FILM COATED ORAL ONCE
COMMUNITY
Start: 2023-01-01 | End: 2023-01-01 | Stop reason: HOSPADM

## 2023-01-01 RX ORDER — GLUCOSAM/CHONDRO/HERB 149/HYAL 750-100 MG
1 TABLET ORAL 2 TIMES DAILY
COMMUNITY

## 2023-01-01 RX ORDER — PREDNISONE 20 MG/1
20 TABLET ORAL DAILY
COMMUNITY
Start: 2023-01-01 | End: 2023-01-01 | Stop reason: HOSPADM

## 2023-01-01 RX ORDER — GLUCOSAM/CHONDRO/HERB 149/HYAL 750-100 MG
TABLET ORAL
COMMUNITY
End: 2023-01-01 | Stop reason: ENTERED-IN-ERROR

## 2023-01-01 RX ORDER — LANOLIN ALCOHOL/MO/W.PET/CERES
100 CREAM (GRAM) TOPICAL DAILY
COMMUNITY
Start: 2017-03-15 | End: 2023-01-01 | Stop reason: ENTERED-IN-ERROR

## 2023-01-01 RX ORDER — GUAIFENESIN 1200 MG/1
1200 TABLET, EXTENDED RELEASE ORAL 2 TIMES DAILY
COMMUNITY
Start: 2019-08-28

## 2023-01-01 RX ORDER — LISINOPRIL 10 MG/1
10 TABLET ORAL DAILY
COMMUNITY
End: 2023-01-01 | Stop reason: ENTERED-IN-ERROR

## 2023-01-01 RX ORDER — IPRATROPIUM BROMIDE AND ALBUTEROL SULFATE 2.5; .5 MG/3ML; MG/3ML
3 SOLUTION RESPIRATORY (INHALATION)
Status: DISCONTINUED | OUTPATIENT
Start: 2023-01-01 | End: 2023-01-01

## 2023-01-01 RX ORDER — HYDROCHLOROTHIAZIDE 12.5 MG/1
12.5 TABLET ORAL DAILY
COMMUNITY
End: 2023-01-01 | Stop reason: ENTERED-IN-ERROR

## 2023-01-01 RX ORDER — FUROSEMIDE 10 MG/ML
40 INJECTION INTRAMUSCULAR; INTRAVENOUS DAILY
Status: DISCONTINUED | OUTPATIENT
Start: 2023-01-01 | End: 2023-01-01 | Stop reason: HOSPADM

## 2023-01-01 RX ORDER — FLUTICASONE PROPIONATE 50 MCG
1 SPRAY, SUSPENSION (ML) NASAL DAILY
COMMUNITY

## 2023-01-01 RX ORDER — PREDNISONE 20 MG/1
20 TABLET ORAL DAILY
Status: DISCONTINUED | OUTPATIENT
Start: 2023-01-01 | End: 2023-01-01

## 2023-01-01 RX ORDER — NYSTATIN 100000 [USP'U]/G
POWDER TOPICAL
COMMUNITY
Start: 2022-02-02 | End: 2023-01-01 | Stop reason: ENTERED-IN-ERROR

## 2023-01-01 RX ORDER — PREDNISONE 20 MG/1
40 TABLET ORAL DAILY
Status: DISCONTINUED | OUTPATIENT
Start: 2023-01-01 | End: 2023-01-01 | Stop reason: HOSPADM

## 2023-01-01 RX ORDER — ALBUTEROL SULFATE 0.83 MG/ML
2.5 SOLUTION RESPIRATORY (INHALATION) ONCE
Status: COMPLETED | OUTPATIENT
Start: 2023-01-01 | End: 2023-01-01

## 2023-01-01 RX ORDER — IPRATROPIUM BROMIDE AND ALBUTEROL 20; 100 UG/1; UG/1
1 SPRAY, METERED RESPIRATORY (INHALATION)
COMMUNITY
Start: 2022-02-15

## 2023-01-01 RX ORDER — HEPARIN SODIUM 5000 [USP'U]/ML
4000 INJECTION, SOLUTION INTRAVENOUS; SUBCUTANEOUS ONCE
Status: DISCONTINUED | OUTPATIENT
Start: 2023-01-01 | End: 2023-01-01

## 2023-01-01 RX ORDER — METOLAZONE 2.5 MG/1
2.5 TABLET ORAL
COMMUNITY
Start: 2021-10-05 | End: 2023-01-01 | Stop reason: ENTERED-IN-ERROR

## 2023-01-01 RX ORDER — PANTOPRAZOLE SODIUM 40 MG/1
40 TABLET, DELAYED RELEASE ORAL DAILY
COMMUNITY
Start: 2019-07-30

## 2023-01-01 RX ORDER — HEPARIN SODIUM 5000 [USP'U]/ML
2000-4000 INJECTION, SOLUTION INTRAVENOUS; SUBCUTANEOUS EVERY 4 HOURS PRN
Status: DISCONTINUED | OUTPATIENT
Start: 2023-01-01 | End: 2023-01-01 | Stop reason: HOSPADM

## 2023-01-01 RX ORDER — ROSUVASTATIN CALCIUM 10 MG/1
10 TABLET, COATED ORAL NIGHTLY
COMMUNITY
End: 2023-01-01 | Stop reason: ENTERED-IN-ERROR

## 2023-01-01 RX ORDER — METRONIDAZOLE 250 MG/1
250 TABLET ORAL
COMMUNITY
Start: 2022-02-23 | End: 2023-01-01 | Stop reason: ENTERED-IN-ERROR

## 2023-01-01 RX ORDER — DOCUSATE SODIUM 100 MG/1
100 CAPSULE, LIQUID FILLED ORAL 2 TIMES DAILY
Status: DISCONTINUED | OUTPATIENT
Start: 2023-01-01 | End: 2023-01-01 | Stop reason: HOSPADM

## 2023-01-01 RX ORDER — ROPINIROLE 3 MG/1
3 TABLET, FILM COATED ORAL DAILY
COMMUNITY
Start: 2021-10-25

## 2023-01-01 RX ORDER — ROPINIROLE 1 MG/1
3 TABLET, FILM COATED ORAL DAILY
Status: DISCONTINUED | OUTPATIENT
Start: 2023-01-01 | End: 2023-01-01 | Stop reason: HOSPADM

## 2023-01-01 RX ORDER — POLYETHYLENE GLYCOL 3350 17 G/17G
17 POWDER, FOR SOLUTION ORAL DAILY
Status: DISCONTINUED | OUTPATIENT
Start: 2023-01-01 | End: 2023-01-01 | Stop reason: HOSPADM

## 2023-01-01 RX ORDER — PREDNISONE 20 MG/1
40 TABLET ORAL DAILY
Qty: 10 TABLET | Refills: 0
Start: 2023-01-01 | End: 2023-01-01

## 2023-01-01 RX ORDER — IPRATROPIUM BROMIDE AND ALBUTEROL SULFATE 2.5; .5 MG/3ML; MG/3ML
3 SOLUTION RESPIRATORY (INHALATION)
Status: DISCONTINUED | OUTPATIENT
Start: 2023-01-01 | End: 2023-01-01 | Stop reason: HOSPADM

## 2023-01-01 RX ORDER — SULFAMETHOXAZOLE AND TRIMETHOPRIM 800; 160 MG/1; MG/1
TABLET ORAL
COMMUNITY
Start: 2022-02-04 | End: 2023-01-01 | Stop reason: ENTERED-IN-ERROR

## 2023-01-01 RX ORDER — IPRATROPIUM BROMIDE AND ALBUTEROL SULFATE 2.5; .5 MG/3ML; MG/3ML
3 SOLUTION RESPIRATORY (INHALATION)
COMMUNITY
Start: 2023-01-01 | End: 2023-01-01 | Stop reason: HOSPADM

## 2023-01-01 RX ORDER — AMLODIPINE BESYLATE 10 MG/1
1 TABLET ORAL DAILY
COMMUNITY

## 2023-01-01 RX ORDER — GUAIFENESIN AND DEXTROMETHORPHAN HYDROBROMIDE 10; 100 MG/5ML; MG/5ML
5 SYRUP ORAL EVERY 4 HOURS PRN
COMMUNITY
Start: 2017-03-15 | End: 2023-01-01 | Stop reason: ENTERED-IN-ERROR

## 2023-01-01 RX ORDER — FUROSEMIDE 10 MG/ML
40 INJECTION INTRAMUSCULAR; INTRAVENOUS EVERY 12 HOURS
Status: DISCONTINUED | OUTPATIENT
Start: 2023-01-01 | End: 2023-01-01

## 2023-01-01 RX ORDER — IPRATROPIUM BROMIDE AND ALBUTEROL SULFATE 2.5; .5 MG/3ML; MG/3ML
3 SOLUTION RESPIRATORY (INHALATION) EVERY 4 HOURS PRN
COMMUNITY
Start: 2021-10-09

## 2023-01-01 RX ORDER — MULTIVITAMIN WITH IRON
1 TABLET ORAL DAILY
COMMUNITY
Start: 2019-07-31 | End: 2023-01-01 | Stop reason: ENTERED-IN-ERROR

## 2023-01-01 RX ORDER — AZITHROMYCIN 250 MG/1
250 TABLET, FILM COATED ORAL
Status: DISCONTINUED | OUTPATIENT
Start: 2023-01-01 | End: 2023-01-01

## 2023-01-01 RX ORDER — HEPARIN SODIUM 10000 [USP'U]/100ML
0-4000 INJECTION, SOLUTION INTRAVENOUS CONTINUOUS
Status: DISCONTINUED | OUTPATIENT
Start: 2023-01-01 | End: 2023-01-01

## 2023-01-01 RX ORDER — ALBUTEROL SULFATE 0.83 MG/ML
2.5 SOLUTION RESPIRATORY (INHALATION) EVERY 2 HOUR PRN
Status: DISCONTINUED | OUTPATIENT
Start: 2023-01-01 | End: 2023-01-01 | Stop reason: HOSPADM

## 2023-01-01 RX ORDER — PREDNISONE 20 MG/1
20 TABLET ORAL 2 TIMES DAILY
COMMUNITY
Start: 2023-01-01 | End: 2023-01-01 | Stop reason: HOSPADM

## 2023-01-01 RX ORDER — AMOXICILLIN AND CLAVULANATE POTASSIUM 875; 125 MG/1; MG/1
1 TABLET, FILM COATED ORAL 2 TIMES DAILY
Qty: 8 TABLET | Refills: 0
Start: 2023-01-01 | End: 2023-01-01

## 2023-01-01 RX ORDER — HEPARIN SODIUM 5000 [USP'U]/ML
INJECTION, SOLUTION INTRAVENOUS; SUBCUTANEOUS EVERY 4 HOURS PRN
Status: DISCONTINUED | OUTPATIENT
Start: 2023-01-01 | End: 2023-01-01

## 2023-01-01 RX ORDER — AMMONIUM LACTATE 12 G/100G
LOTION TOPICAL 2 TIMES DAILY
Status: DISCONTINUED | OUTPATIENT
Start: 2023-01-01 | End: 2023-01-01 | Stop reason: HOSPADM

## 2023-01-01 RX ORDER — LORATADINE 10 MG/1
10 TABLET ORAL DAILY
COMMUNITY
Start: 2021-10-01 | End: 2023-01-01 | Stop reason: ENTERED-IN-ERROR

## 2023-01-01 RX ORDER — AZITHROMYCIN 250 MG/1
250 TABLET, FILM COATED ORAL DAILY
COMMUNITY
Start: 2023-01-01 | End: 2023-01-01 | Stop reason: HOSPADM

## 2023-01-01 RX ORDER — HEPARIN SODIUM 10000 [USP'U]/100ML
0-4500 INJECTION, SOLUTION INTRAVENOUS CONTINUOUS
Status: DISCONTINUED | OUTPATIENT
Start: 2023-01-01 | End: 2023-01-01

## 2023-01-01 RX ORDER — TRIAMCINOLONE ACETONIDE 1 MG/G
CREAM TOPICAL
COMMUNITY
Start: 2021-10-09 | End: 2023-01-01 | Stop reason: ENTERED-IN-ERROR

## 2023-01-01 RX ORDER — FUROSEMIDE 20 MG/1
1 TABLET ORAL DAILY
Status: ON HOLD | COMMUNITY
End: 2023-01-01 | Stop reason: SDUPTHER

## 2023-01-01 RX ORDER — AMLODIPINE BESYLATE 5 MG/1
10 TABLET ORAL DAILY
Status: DISCONTINUED | OUTPATIENT
Start: 2023-01-01 | End: 2023-01-01 | Stop reason: HOSPADM

## 2023-01-01 RX ORDER — GUAIFENESIN 600 MG/1
600 TABLET, EXTENDED RELEASE ORAL 2 TIMES DAILY PRN
Status: DISCONTINUED | OUTPATIENT
Start: 2023-01-01 | End: 2023-01-01 | Stop reason: HOSPADM

## 2023-01-01 RX ORDER — ENOXAPARIN SODIUM 100 MG/ML
30 INJECTION SUBCUTANEOUS EVERY 24 HOURS
Status: DISCONTINUED | OUTPATIENT
Start: 2023-01-01 | End: 2023-01-01

## 2023-01-01 RX ORDER — TRIAMCINOLONE ACETONIDE 1 MG/G
1 OINTMENT TOPICAL 2 TIMES DAILY
COMMUNITY
Start: 2021-10-09 | End: 2023-01-01 | Stop reason: ENTERED-IN-ERROR

## 2023-01-01 RX ORDER — HEPARIN SODIUM 5000 [USP'U]/ML
80 INJECTION, SOLUTION INTRAVENOUS; SUBCUTANEOUS ONCE
Status: COMPLETED | OUTPATIENT
Start: 2023-01-01 | End: 2023-01-01

## 2023-01-01 RX ORDER — BENZONATATE 100 MG/1
100 CAPSULE ORAL 3 TIMES DAILY
COMMUNITY

## 2023-01-01 RX ORDER — FUROSEMIDE 20 MG/1
40 TABLET ORAL DAILY
Qty: 30 TABLET | Refills: 0
Start: 2023-01-01 | End: 2024-01-17

## 2023-01-01 RX ORDER — CETIRIZINE HYDROCHLORIDE 10 MG/1
1 TABLET ORAL DAILY
COMMUNITY

## 2023-01-01 RX ORDER — LOSARTAN POTASSIUM 100 MG/1
100 TABLET ORAL
COMMUNITY
Start: 2021-10-01 | End: 2023-01-01 | Stop reason: ENTERED-IN-ERROR

## 2023-01-01 RX ORDER — TALC
3 POWDER (GRAM) TOPICAL NIGHTLY PRN
COMMUNITY
Start: 2019-07-31 | End: 2023-01-01 | Stop reason: ENTERED-IN-ERROR

## 2023-01-01 RX ORDER — ACETAMINOPHEN 325 MG/1
650 TABLET ORAL EVERY 4 HOURS PRN
Status: DISCONTINUED | OUTPATIENT
Start: 2023-01-01 | End: 2023-01-01 | Stop reason: HOSPADM

## 2023-01-01 RX ORDER — ACETAMINOPHEN 325 MG/1
650 TABLET ORAL EVERY 4 HOURS PRN
COMMUNITY

## 2023-01-01 RX ADMIN — HEPARIN SODIUM 5500 UNITS: 5000 INJECTION, SOLUTION INTRAVENOUS; SUBCUTANEOUS at 10:30

## 2023-01-01 RX ADMIN — POLYETHYLENE GLYCOL 3350 17 G: 17 POWDER, FOR SOLUTION ORAL at 10:09

## 2023-01-01 RX ADMIN — ROPINIROLE HYDROCHLORIDE 3 MG: 1 TABLET, FILM COATED ORAL at 08:36

## 2023-01-01 RX ADMIN — Medication 3 L/MIN: at 07:50

## 2023-01-01 RX ADMIN — METHYLPREDNISOLONE SODIUM SUCCINATE 125 MG: 125 INJECTION, POWDER, FOR SOLUTION INTRAMUSCULAR; INTRAVENOUS at 17:29

## 2023-01-01 RX ADMIN — AMLODIPINE BESYLATE 10 MG: 5 TABLET ORAL at 09:10

## 2023-01-01 RX ADMIN — ACETAMINOPHEN 650 MG: 325 TABLET ORAL at 01:57

## 2023-01-01 RX ADMIN — DOCUSATE SODIUM 100 MG: 100 CAPSULE, LIQUID FILLED ORAL at 08:54

## 2023-01-01 RX ADMIN — AMLODIPINE BESYLATE 10 MG: 5 TABLET ORAL at 08:56

## 2023-01-01 RX ADMIN — FUROSEMIDE 40 MG: 10 INJECTION, SOLUTION INTRAMUSCULAR; INTRAVENOUS at 17:29

## 2023-01-01 RX ADMIN — AMLODIPINE BESYLATE 10 MG: 5 TABLET ORAL at 17:29

## 2023-01-01 RX ADMIN — ROPINIROLE HYDROCHLORIDE 3 MG: 1 TABLET, FILM COATED ORAL at 09:17

## 2023-01-01 RX ADMIN — IPRATROPIUM BROMIDE AND ALBUTEROL SULFATE 3 ML: 2.5; .5 SOLUTION RESPIRATORY (INHALATION) at 20:10

## 2023-01-01 RX ADMIN — IPRATROPIUM BROMIDE AND ALBUTEROL SULFATE 3 ML: 2.5; .5 SOLUTION RESPIRATORY (INHALATION) at 10:35

## 2023-01-01 RX ADMIN — SODIUM CHLORIDE 1000 ML: 9 INJECTION, SOLUTION INTRAVENOUS at 09:09

## 2023-01-01 RX ADMIN — HEPARIN SODIUM 13.2 UNITS/HR: 10000 INJECTION, SOLUTION INTRAVENOUS at 00:03

## 2023-01-01 RX ADMIN — PREDNISONE 40 MG: 20 TABLET ORAL at 20:56

## 2023-01-01 RX ADMIN — PANTOPRAZOLE SODIUM 40 MG: 40 TABLET, DELAYED RELEASE ORAL at 08:36

## 2023-01-01 RX ADMIN — FUROSEMIDE 40 MG: 10 INJECTION, SOLUTION INTRAMUSCULAR; INTRAVENOUS at 08:35

## 2023-01-01 RX ADMIN — APIXABAN 10 MG: 5 TABLET, FILM COATED ORAL at 08:55

## 2023-01-01 RX ADMIN — HEPARIN SODIUM 1320 UNITS/HR: 10000 INJECTION, SOLUTION INTRAVENOUS at 15:27

## 2023-01-01 RX ADMIN — PREDNISONE 40 MG: 20 TABLET ORAL at 08:55

## 2023-01-01 RX ADMIN — AMLODIPINE BESYLATE 10 MG: 5 TABLET ORAL at 09:36

## 2023-01-01 RX ADMIN — ALBUTEROL SULFATE 2.5 MG: 2.5 SOLUTION RESPIRATORY (INHALATION) at 09:10

## 2023-01-01 RX ADMIN — ROPINIROLE HYDROCHLORIDE 3 MG: 1 TABLET, FILM COATED ORAL at 09:36

## 2023-01-01 RX ADMIN — DOCUSATE SODIUM 100 MG: 100 CAPSULE, LIQUID FILLED ORAL at 09:17

## 2023-01-01 RX ADMIN — ROPINIROLE HYDROCHLORIDE 3 MG: 1 TABLET, FILM COATED ORAL at 09:10

## 2023-01-01 RX ADMIN — PIPERACILLIN SODIUM AND TAZOBACTAM SODIUM 3.38 G: 3; .375 INJECTION, SOLUTION INTRAVENOUS at 15:04

## 2023-01-01 RX ADMIN — Medication: at 08:36

## 2023-01-01 RX ADMIN — AMLODIPINE BESYLATE 10 MG: 5 TABLET ORAL at 09:17

## 2023-01-01 RX ADMIN — IPRATROPIUM BROMIDE AND ALBUTEROL SULFATE 3 ML: 2.5; .5 SOLUTION RESPIRATORY (INHALATION) at 20:03

## 2023-01-01 RX ADMIN — APIXABAN 10 MG: 5 TABLET, FILM COATED ORAL at 11:44

## 2023-01-01 RX ADMIN — IPRATROPIUM BROMIDE AND ALBUTEROL SULFATE 3 ML: 2.5; .5 SOLUTION RESPIRATORY (INHALATION) at 19:26

## 2023-01-01 RX ADMIN — AMLODIPINE BESYLATE 10 MG: 5 TABLET ORAL at 08:36

## 2023-01-01 RX ADMIN — Medication 60 L/MIN: at 09:50

## 2023-01-01 RX ADMIN — PIPERACILLIN SODIUM AND TAZOBACTAM SODIUM 3.38 G: 3; .375 INJECTION, SOLUTION INTRAVENOUS at 20:31

## 2023-01-01 RX ADMIN — PREDNISONE 40 MG: 20 TABLET ORAL at 08:36

## 2023-01-01 RX ADMIN — POLYETHYLENE GLYCOL 3350 17 G: 17 POWDER, FOR SOLUTION ORAL at 08:35

## 2023-01-01 RX ADMIN — Medication: at 09:01

## 2023-01-01 RX ADMIN — ENOXAPARIN SODIUM 30 MG: 30 INJECTION SUBCUTANEOUS at 16:03

## 2023-01-01 RX ADMIN — ROPINIROLE HYDROCHLORIDE 3 MG: 1 TABLET, FILM COATED ORAL at 17:28

## 2023-01-01 RX ADMIN — PIPERACILLIN SODIUM AND TAZOBACTAM SODIUM 3.38 G: 3; .375 INJECTION, SOLUTION INTRAVENOUS at 08:54

## 2023-01-01 RX ADMIN — PANTOPRAZOLE SODIUM 40 MG: 40 TABLET, DELAYED RELEASE ORAL at 09:17

## 2023-01-01 RX ADMIN — AZITHROMYCIN DIHYDRATE 250 MG: 250 TABLET ORAL at 09:36

## 2023-01-01 RX ADMIN — IPRATROPIUM BROMIDE AND ALBUTEROL SULFATE 3 ML: 2.5; .5 SOLUTION RESPIRATORY (INHALATION) at 09:02

## 2023-01-01 RX ADMIN — Medication: at 10:35

## 2023-01-01 RX ADMIN — AZITHROMYCIN DIHYDRATE 250 MG: 250 TABLET ORAL at 09:17

## 2023-01-01 RX ADMIN — AZITHROMYCIN DIHYDRATE 250 MG: 250 TABLET ORAL at 09:10

## 2023-01-01 RX ADMIN — ROPINIROLE HYDROCHLORIDE 3 MG: 1 TABLET, FILM COATED ORAL at 08:54

## 2023-01-01 RX ADMIN — PIPERACILLIN SODIUM AND TAZOBACTAM SODIUM 3.38 G: 3; .375 INJECTION, SOLUTION INTRAVENOUS at 08:34

## 2023-01-01 RX ADMIN — PIPERACILLIN SODIUM AND TAZOBACTAM SODIUM 4.5 G: 4; .5 INJECTION, SOLUTION INTRAVENOUS at 09:10

## 2023-01-01 RX ADMIN — PANTOPRAZOLE SODIUM 40 MG: 40 TABLET, DELAYED RELEASE ORAL at 08:55

## 2023-01-01 RX ADMIN — Medication: at 09:18

## 2023-01-01 RX ADMIN — PANTOPRAZOLE SODIUM 40 MG: 40 TABLET, DELAYED RELEASE ORAL at 09:36

## 2023-01-01 RX ADMIN — FUROSEMIDE 40 MG: 10 INJECTION, SOLUTION INTRAMUSCULAR; INTRAVENOUS at 17:31

## 2023-01-01 RX ADMIN — Medication 6 L/MIN: at 14:33

## 2023-01-01 RX ADMIN — AZITHROMYCIN MONOHYDRATE 500 MG: 500 INJECTION, POWDER, LYOPHILIZED, FOR SOLUTION INTRAVENOUS at 10:15

## 2023-01-01 RX ADMIN — IPRATROPIUM BROMIDE AND ALBUTEROL SULFATE 3 ML: 2.5; .5 SOLUTION RESPIRATORY (INHALATION) at 07:50

## 2023-01-01 RX ADMIN — DOCUSATE SODIUM 100 MG: 100 CAPSULE, LIQUID FILLED ORAL at 11:26

## 2023-01-01 RX ADMIN — PREDNISONE 40 MG: 20 TABLET ORAL at 09:17

## 2023-01-01 RX ADMIN — DOCUSATE SODIUM 100 MG: 100 CAPSULE, LIQUID FILLED ORAL at 20:31

## 2023-01-01 RX ADMIN — PREDNISONE 40 MG: 20 TABLET ORAL at 09:36

## 2023-01-01 RX ADMIN — IPRATROPIUM BROMIDE AND ALBUTEROL SULFATE 3 ML: 2.5; .5 SOLUTION RESPIRATORY (INHALATION) at 19:55

## 2023-01-01 RX ADMIN — HEPARIN SODIUM 1224 UNITS/HR: 10000 INJECTION, SOLUTION INTRAVENOUS at 10:58

## 2023-01-01 RX ADMIN — Medication: at 12:34

## 2023-01-01 RX ADMIN — PIPERACILLIN SODIUM AND TAZOBACTAM SODIUM 3.38 G: 3; .375 INJECTION, SOLUTION INTRAVENOUS at 20:57

## 2023-01-01 RX ADMIN — FUROSEMIDE 40 MG: 10 INJECTION, SOLUTION INTRAMUSCULAR; INTRAVENOUS at 09:17

## 2023-01-01 RX ADMIN — DOCUSATE SODIUM 100 MG: 100 CAPSULE, LIQUID FILLED ORAL at 08:36

## 2023-01-01 RX ADMIN — APIXABAN 10 MG: 5 TABLET, FILM COATED ORAL at 20:31

## 2023-01-01 RX ADMIN — PANTOPRAZOLE SODIUM 40 MG: 40 TABLET, DELAYED RELEASE ORAL at 09:09

## 2023-01-01 RX ADMIN — POLYETHYLENE GLYCOL 3350 17 G: 17 POWDER, FOR SOLUTION ORAL at 09:22

## 2023-01-01 RX ADMIN — FUROSEMIDE 40 MG: 10 INJECTION, SOLUTION INTRAMUSCULAR; INTRAVENOUS at 05:10

## 2023-01-01 RX ADMIN — IPRATROPIUM BROMIDE AND ALBUTEROL SULFATE 3 ML: 2.5; .5 SOLUTION RESPIRATORY (INHALATION) at 09:18

## 2023-01-01 RX ADMIN — Medication: at 20:31

## 2023-01-01 RX ADMIN — KIT FOR THE PREPARATION OF TECHNETIUM TC 99M ALBUMIN AGGREGATED 4.1 MILLICURIE: 2.5 INJECTION, POWDER, FOR SOLUTION INTRAVENOUS at 18:42

## 2023-01-01 RX ADMIN — IPRATROPIUM BROMIDE AND ALBUTEROL SULFATE 3 ML: 2.5; .5 SOLUTION RESPIRATORY (INHALATION) at 21:20

## 2023-01-01 RX ADMIN — POLYETHYLENE GLYCOL 3350 17 G: 17 POWDER, FOR SOLUTION ORAL at 09:37

## 2023-01-01 RX ADMIN — Medication: at 20:57

## 2023-01-01 RX ADMIN — AZITHROMYCIN DIHYDRATE 250 MG: 250 TABLET ORAL at 17:29

## 2023-01-01 RX ADMIN — IPRATROPIUM BROMIDE AND ALBUTEROL SULFATE 3 ML: 2.5; .5 SOLUTION RESPIRATORY (INHALATION) at 14:33

## 2023-01-01 RX ADMIN — PIPERACILLIN SODIUM AND TAZOBACTAM SODIUM 3.38 G: 3; .375 INJECTION, SOLUTION INTRAVENOUS at 16:56

## 2023-01-01 RX ADMIN — Medication: at 20:45

## 2023-01-01 RX ADMIN — HEPARIN SODIUM 1320 UNITS/HR: 10000 INJECTION, SOLUTION INTRAVENOUS at 01:43

## 2023-01-01 RX ADMIN — AZITHROMYCIN DIHYDRATE 250 MG: 250 TABLET ORAL at 08:36

## 2023-01-01 RX ADMIN — FUROSEMIDE 40 MG: 10 INJECTION, SOLUTION INTRAMUSCULAR; INTRAVENOUS at 05:02

## 2023-01-01 RX ADMIN — IPRATROPIUM BROMIDE AND ALBUTEROL SULFATE 3 ML: 2.5; .5 SOLUTION RESPIRATORY (INHALATION) at 14:42

## 2023-01-01 RX ADMIN — IPRATROPIUM BROMIDE AND ALBUTEROL SULFATE 3 ML: 2.5; .5 SOLUTION RESPIRATORY (INHALATION) at 14:47

## 2023-01-01 RX ADMIN — DOCUSATE SODIUM 100 MG: 100 CAPSULE, LIQUID FILLED ORAL at 20:40

## 2023-01-01 RX ADMIN — VANCOMYCIN HYDROCHLORIDE 2 G: 10 INJECTION, POWDER, LYOPHILIZED, FOR SOLUTION INTRAVENOUS at 11:07

## 2023-01-01 RX ADMIN — PANTOPRAZOLE SODIUM 40 MG: 40 TABLET, DELAYED RELEASE ORAL at 17:29

## 2023-01-01 SDOH — SOCIAL STABILITY: SOCIAL INSECURITY: ARE THERE ANY APPARENT SIGNS OF INJURIES/BEHAVIORS THAT COULD BE RELATED TO ABUSE/NEGLECT?: NO

## 2023-01-01 SDOH — SOCIAL STABILITY: SOCIAL INSECURITY: DOES ANYONE TRY TO KEEP YOU FROM HAVING/CONTACTING OTHER FRIENDS OR DOING THINGS OUTSIDE YOUR HOME?: NO

## 2023-01-01 SDOH — SOCIAL STABILITY: SOCIAL INSECURITY: ARE YOU OR HAVE YOU BEEN THREATENED OR ABUSED PHYSICALLY, EMOTIONALLY, OR SEXUALLY BY ANYONE?: NO

## 2023-01-01 SDOH — SOCIAL STABILITY: SOCIAL INSECURITY: WERE YOU ABLE TO COMPLETE ALL THE BEHAVIORAL HEALTH SCREENINGS?: YES

## 2023-01-01 SDOH — SOCIAL STABILITY: SOCIAL INSECURITY: ABUSE: ADULT

## 2023-01-01 SDOH — SOCIAL STABILITY: SOCIAL INSECURITY: HAS ANYONE EVER THREATENED TO HURT YOUR FAMILY OR YOUR PETS?: NO

## 2023-01-01 SDOH — SOCIAL STABILITY: SOCIAL INSECURITY: HAVE YOU HAD THOUGHTS OF HARMING ANYONE ELSE?: NO

## 2023-01-01 SDOH — SOCIAL STABILITY: SOCIAL INSECURITY: DO YOU FEEL UNSAFE GOING BACK TO THE PLACE WHERE YOU ARE LIVING?: NO

## 2023-01-01 SDOH — SOCIAL STABILITY: SOCIAL INSECURITY: DO YOU FEEL ANYONE HAS EXPLOITED OR TAKEN ADVANTAGE OF YOU FINANCIALLY OR OF YOUR PERSONAL PROPERTY?: NO

## 2023-01-01 ASSESSMENT — COGNITIVE AND FUNCTIONAL STATUS - GENERAL
STANDING UP FROM CHAIR USING ARMS: A LITTLE
CLIMB 3 TO 5 STEPS WITH RAILING: A LOT
TOILETING: A LOT
DAILY ACTIVITIY SCORE: 18
WALKING IN HOSPITAL ROOM: TOTAL
DAILY ACTIVITIY SCORE: 16
STANDING UP FROM CHAIR USING ARMS: A LOT
TURNING FROM BACK TO SIDE WHILE IN FLAT BAD: A LOT
CLIMB 3 TO 5 STEPS WITH RAILING: A LOT
STANDING UP FROM CHAIR USING ARMS: A LITTLE
MOVING TO AND FROM BED TO CHAIR: A LOT
DRESSING REGULAR UPPER BODY CLOTHING: A LITTLE
MOVING FROM LYING ON BACK TO SITTING ON SIDE OF FLAT BED WITH BEDRAILS: A LITTLE
MOBILITY SCORE: 16
DAILY ACTIVITIY SCORE: 16
DRESSING REGULAR LOWER BODY CLOTHING: A LITTLE
DRESSING REGULAR UPPER BODY CLOTHING: A LITTLE
CLIMB 3 TO 5 STEPS WITH RAILING: A LOT
MOVING TO AND FROM BED TO CHAIR: A LITTLE
MOVING FROM LYING ON BACK TO SITTING ON SIDE OF FLAT BED WITH BEDRAILS: A LITTLE
DRESSING REGULAR LOWER BODY CLOTHING: A LITTLE
DRESSING REGULAR UPPER BODY CLOTHING: A LOT
MOVING TO AND FROM BED TO CHAIR: A LITTLE
MOVING TO AND FROM BED TO CHAIR: A LITTLE
TOILETING: A LOT
DRESSING REGULAR UPPER BODY CLOTHING: A LITTLE
EATING MEALS: A LITTLE
EATING MEALS: A LOT
WALKING IN HOSPITAL ROOM: TOTAL
TOILETING: A LITTLE
STANDING UP FROM CHAIR USING ARMS: A LOT
PERSONAL GROOMING: A LITTLE
DRESSING REGULAR LOWER BODY CLOTHING: A LITTLE
MOBILITY SCORE: 10
EATING MEALS: A LITTLE
MOVING FROM LYING ON BACK TO SITTING ON SIDE OF FLAT BED WITH BEDRAILS: A LOT
DAILY ACTIVITIY SCORE: 16
WALKING IN HOSPITAL ROOM: A LOT
DAILY ACTIVITIY SCORE: 16
TOILETING: A LITTLE
PERSONAL GROOMING: A LOT
PERSONAL GROOMING: A LITTLE
MOBILITY SCORE: 15
MOVING FROM LYING ON BACK TO SITTING ON SIDE OF FLAT BED WITH BEDRAILS: A LITTLE
MOVING FROM LYING ON BACK TO SITTING ON SIDE OF FLAT BED WITH BEDRAILS: A LITTLE
STANDING UP FROM CHAIR USING ARMS: A LOT
EATING MEALS: A LITTLE
HELP NEEDED FOR BATHING: A LITTLE
PERSONAL GROOMING: A LITTLE
CLIMB 3 TO 5 STEPS WITH RAILING: A LOT
DRESSING REGULAR LOWER BODY CLOTHING: A LITTLE
EATING MEALS: A LITTLE
MOVING TO AND FROM BED TO CHAIR: A LITTLE
DAILY ACTIVITIY SCORE: 16
TOILETING: A LOT
TURNING FROM BACK TO SIDE WHILE IN FLAT BAD: A LITTLE
CLIMB 3 TO 5 STEPS WITH RAILING: TOTAL
DAILY ACTIVITIY SCORE: 16
CLIMB 3 TO 5 STEPS WITH RAILING: A LOT
WALKING IN HOSPITAL ROOM: A LOT
DRESSING REGULAR LOWER BODY CLOTHING: A LITTLE
WALKING IN HOSPITAL ROOM: TOTAL
TURNING FROM BACK TO SIDE WHILE IN FLAT BAD: A LITTLE
MOBILITY SCORE: 17
STANDING UP FROM CHAIR USING ARMS: A LOT
HELP NEEDED FOR BATHING: A LOT
EATING MEALS: A LITTLE
CLIMB 3 TO 5 STEPS WITH RAILING: TOTAL
MOBILITY SCORE: 17
HELP NEEDED FOR BATHING: A LOT
HELP NEEDED FOR BATHING: A LITTLE
PERSONAL GROOMING: A LOT
CLIMB 3 TO 5 STEPS WITH RAILING: A LOT
PERSONAL GROOMING: A LOT
DRESSING REGULAR LOWER BODY CLOTHING: A LITTLE
TURNING FROM BACK TO SIDE WHILE IN FLAT BAD: A LITTLE
TOILETING: A LITTLE
DRESSING REGULAR UPPER BODY CLOTHING: A LITTLE
MOBILITY SCORE: 13
DRESSING REGULAR UPPER BODY CLOTHING: A LITTLE
MOVING FROM LYING ON BACK TO SITTING ON SIDE OF FLAT BED WITH BEDRAILS: A LITTLE
HELP NEEDED FOR BATHING: A LITTLE
PERSONAL GROOMING: A LOT
CLIMB 3 TO 5 STEPS WITH RAILING: TOTAL
DAILY ACTIVITIY SCORE: 18
WALKING IN HOSPITAL ROOM: A LITTLE
MOVING TO AND FROM BED TO CHAIR: A LITTLE
EATING MEALS: A LITTLE
MOVING TO AND FROM BED TO CHAIR: A LITTLE
DAILY ACTIVITIY SCORE: 17
PATIENT BASELINE BEDBOUND: NO
MOBILITY SCORE: 14
CLIMB 3 TO 5 STEPS WITH RAILING: A LOT
HELP NEEDED FOR BATHING: A LOT
TOILETING: A LOT
MOVING FROM LYING ON BACK TO SITTING ON SIDE OF FLAT BED WITH BEDRAILS: A LITTLE
CLIMB 3 TO 5 STEPS WITH RAILING: A LOT
EATING MEALS: A LITTLE
MOVING TO AND FROM BED TO CHAIR: A LOT
WALKING IN HOSPITAL ROOM: A LOT
DRESSING REGULAR UPPER BODY CLOTHING: A LITTLE
DRESSING REGULAR LOWER BODY CLOTHING: A LOT
WALKING IN HOSPITAL ROOM: A LITTLE
DRESSING REGULAR LOWER BODY CLOTHING: A LITTLE
STANDING UP FROM CHAIR USING ARMS: A LITTLE
HELP NEEDED FOR BATHING: A LITTLE
PERSONAL GROOMING: A LITTLE
STANDING UP FROM CHAIR USING ARMS: A LOT
WALKING IN HOSPITAL ROOM: A LITTLE
TURNING FROM BACK TO SIDE WHILE IN FLAT BAD: A LITTLE
HELP NEEDED FOR BATHING: A LITTLE
EATING MEALS: A LITTLE
WALKING IN HOSPITAL ROOM: A LITTLE
TOILETING: A LOT
MOVING TO AND FROM BED TO CHAIR: A LITTLE
DAILY ACTIVITIY SCORE: 16
TURNING FROM BACK TO SIDE WHILE IN FLAT BAD: A LITTLE
WALKING IN HOSPITAL ROOM: A LITTLE
PERSONAL GROOMING: A LOT
STANDING UP FROM CHAIR USING ARMS: A LITTLE
HELP NEEDED FOR BATHING: A LITTLE
TOILETING: A LOT
TURNING FROM BACK TO SIDE WHILE IN FLAT BAD: A LITTLE
DRESSING REGULAR UPPER BODY CLOTHING: A LITTLE
DAILY ACTIVITIY SCORE: 12
WALKING IN HOSPITAL ROOM: TOTAL
DRESSING REGULAR UPPER BODY CLOTHING: A LITTLE
MOVING TO AND FROM BED TO CHAIR: A LITTLE
EATING MEALS: A LITTLE
STANDING UP FROM CHAIR USING ARMS: A LITTLE
DAILY ACTIVITIY SCORE: 16
TOILETING: A LOT
DRESSING REGULAR LOWER BODY CLOTHING: A LITTLE
TURNING FROM BACK TO SIDE WHILE IN FLAT BAD: A LITTLE
MOVING TO AND FROM BED TO CHAIR: A LITTLE
MOVING FROM LYING ON BACK TO SITTING ON SIDE OF FLAT BED WITH BEDRAILS: A LITTLE
DRESSING REGULAR LOWER BODY CLOTHING: A LITTLE
TURNING FROM BACK TO SIDE WHILE IN FLAT BAD: A LITTLE
MOBILITY SCORE: 13
HELP NEEDED FOR BATHING: A LITTLE
DRESSING REGULAR LOWER BODY CLOTHING: A LITTLE
STANDING UP FROM CHAIR USING ARMS: A LOT
DRESSING REGULAR UPPER BODY CLOTHING: A LITTLE
MOBILITY SCORE: 17
TURNING FROM BACK TO SIDE WHILE IN FLAT BAD: A LITTLE
PERSONAL GROOMING: A LOT
TURNING FROM BACK TO SIDE WHILE IN FLAT BAD: A LITTLE
DRESSING REGULAR UPPER BODY CLOTHING: A LITTLE
DRESSING REGULAR LOWER BODY CLOTHING: A LITTLE
MOBILITY SCORE: 17
PERSONAL GROOMING: A LOT
TURNING FROM BACK TO SIDE WHILE IN FLAT BAD: A LITTLE
HELP NEEDED FOR BATHING: A LITTLE
MOBILITY SCORE: 17
MOVING FROM LYING ON BACK TO SITTING ON SIDE OF FLAT BED WITH BEDRAILS: A LITTLE
TOILETING: A LOT
MOVING FROM LYING ON BACK TO SITTING ON SIDE OF FLAT BED WITH BEDRAILS: A LITTLE
EATING MEALS: A LITTLE
MOBILITY SCORE: 13
MOVING FROM LYING ON BACK TO SITTING ON SIDE OF FLAT BED WITH BEDRAILS: A LITTLE
HELP NEEDED FOR BATHING: A LITTLE
MOVING TO AND FROM BED TO CHAIR: A LITTLE
EATING MEALS: A LITTLE
PERSONAL GROOMING: A LOT
CLIMB 3 TO 5 STEPS WITH RAILING: TOTAL
STANDING UP FROM CHAIR USING ARMS: A LITTLE
DRESSING REGULAR UPPER BODY CLOTHING: A LITTLE
TOILETING: A LOT
MOVING FROM LYING ON BACK TO SITTING ON SIDE OF FLAT BED WITH BEDRAILS: A LITTLE

## 2023-01-01 ASSESSMENT — ENCOUNTER SYMPTOMS
GASTROINTESTINAL NEGATIVE: 1
NAUSEA: 0
NEUROLOGICAL NEGATIVE: 1
CARDIOVASCULAR NEGATIVE: 1
SHORTNESS OF BREATH: 1
LOSS OF SENSATION IN FEET: 1
ABDOMINAL DISTENTION: 0
OCCASIONAL FEELINGS OF UNSTEADINESS: 1
FEVER: 0
COUGH: 1
DYSURIA: 0
SHORTNESS OF BREATH: 1
CONSTIPATION: 0
DIARRHEA: 0
WHEEZING: 1
ABDOMINAL PAIN: 0
PSYCHIATRIC NEGATIVE: 1
CHILLS: 0
VOMITING: 0

## 2023-01-01 ASSESSMENT — COLUMBIA-SUICIDE SEVERITY RATING SCALE - C-SSRS
1. IN THE PAST MONTH, HAVE YOU WISHED YOU WERE DEAD OR WISHED YOU COULD GO TO SLEEP AND NOT WAKE UP?: NO
6. HAVE YOU EVER DONE ANYTHING, STARTED TO DO ANYTHING, OR PREPARED TO DO ANYTHING TO END YOUR LIFE?: NO
2. HAVE YOU ACTUALLY HAD ANY THOUGHTS OF KILLING YOURSELF?: NO

## 2023-01-01 ASSESSMENT — PAIN SCALES - GENERAL
PAINLEVEL_OUTOF10: 0 - NO PAIN
PAINLEVEL_OUTOF10: 0 - NO PAIN
PAINLEVEL_OUTOF10: 3
PAINLEVEL_OUTOF10: 0 - NO PAIN
PAINLEVEL: 0-NO PAIN
PAINLEVEL_OUTOF10: 0 - NO PAIN

## 2023-01-01 ASSESSMENT — ACTIVITIES OF DAILY LIVING (ADL)
ADEQUATE_TO_COMPLETE_ADL: YES
HEARING - LEFT EAR: FUNCTIONAL
PATIENT'S MEMORY ADEQUATE TO SAFELY COMPLETE DAILY ACTIVITIES?: YES
HEARING - RIGHT EAR: FUNCTIONAL
TOILETING: NEEDS ASSISTANCE
LACK_OF_TRANSPORTATION: NO
GROOMING: NEEDS ASSISTANCE
BATHING: NEEDS ASSISTANCE
FEEDING YOURSELF: NEEDS ASSISTANCE
WALKS IN HOME: NEEDS ASSISTANCE
ASSISTIVE_DEVICE: WHEELCHAIR
JUDGMENT_ADEQUATE_SAFELY_COMPLETE_DAILY_ACTIVITIES: YES
DRESSING YOURSELF: NEEDS ASSISTANCE

## 2023-01-01 ASSESSMENT — LIFESTYLE VARIABLES
AUDIT-C TOTAL SCORE: 0
HAVE PEOPLE ANNOYED YOU BY CRITICIZING YOUR DRINKING: NO
HOW OFTEN DO YOU HAVE A DRINK CONTAINING ALCOHOL: NEVER
HOW OFTEN DO YOU HAVE 6 OR MORE DRINKS ON ONE OCCASION: NEVER
HOW MANY STANDARD DRINKS CONTAINING ALCOHOL DO YOU HAVE ON A TYPICAL DAY: PATIENT DOES NOT DRINK
REASON UNABLE TO ASSESS: NO
AUDIT-C TOTAL SCORE: 0
EVER HAD A DRINK FIRST THING IN THE MORNING TO STEADY YOUR NERVES TO GET RID OF A HANGOVER: NO
EVER FELT BAD OR GUILTY ABOUT YOUR DRINKING: NO
SKIP TO QUESTIONS 9-10: 1
HAVE YOU EVER FELT YOU SHOULD CUT DOWN ON YOUR DRINKING: NO

## 2023-01-01 ASSESSMENT — PAIN - FUNCTIONAL ASSESSMENT
PAIN_FUNCTIONAL_ASSESSMENT: 0-10

## 2023-01-01 ASSESSMENT — PATIENT HEALTH QUESTIONNAIRE - PHQ9
2. FEELING DOWN, DEPRESSED OR HOPELESS: NOT AT ALL
SUM OF ALL RESPONSES TO PHQ9 QUESTIONS 1 & 2: 0
1. LITTLE INTEREST OR PLEASURE IN DOING THINGS: NOT AT ALL

## 2023-01-01 ASSESSMENT — PAIN DESCRIPTION - DESCRIPTORS: DESCRIPTORS: ACHING

## 2023-03-09 PROBLEM — I10 BENIGN HYPERTENSION: Status: ACTIVE | Noted: 2023-01-01

## 2023-03-09 PROBLEM — J44.9 CHRONIC OBSTRUCTIVE PULMONARY DISEASE (MULTI): Status: ACTIVE | Noted: 2023-01-01

## 2023-03-09 PROBLEM — I50.9 HEART FAILURE (MULTI): Status: ACTIVE | Noted: 2023-01-01

## 2023-03-09 NOTE — LETTER
Resident seen 3/9/2022 -- MP    CC: LTC ([facility]) Recheck    : 3/6/1928  NKDA  Full Code  Admit H&P done 10/11/2021    S: 89 yo man with chronic stable CHF, HTN, GERD, COPD, diffuse OA, RLS. No cp, sob per NURSING REPORT. Med List & Problem List reviewed.    O: VSS AFEB Wt 148# (down 3#) Off O2! Sleeping soundly. NAD. Chest exp wheeze. Heart rrr (PPM) 2/6 LUCAS. 4+/5 MS upper extremities. 3-/5 LE bilaterally.    LAB (10/12/22) Alb 3.6, Cr 1.51, GFR 43, Hgb 12, Na 147->144, K 4.5    COGNITIVE:  SLUMS  ->   MOCA  ->     A/P: 2+ chronic conditions, med mgmt  # COPD: stable on duoneb, mucinex, OFF O2, continue I.S., Acupella, appreciate Dr Morales's pulmonary expertise.  # HTN: up despite Norvasc 10 mg. Resume ARB/losartan 50 mg daily 3/9/23. Check cmp 3/20.  # FEN/PCM: weight down since off supplement. Continue to Push fluids for hypernatremia.  # Muscle Weakness and OA: no longer able to safely walk with walker. See mobility eval 2022.  # CHF: stable on lasix 20 mg bid. Weekly wts. 2gm Na. F/U with cardiology.  # Hx Recurrent UTI/Prostatitis 2022: resolved.  # GERD: PPI  # RLS: stable on Requip 3 mg at hs.  # OA: fish oil per patient request, routine Tylenol 500 2 tid.  # MCI: MOCA/SLUMS improved from  to  with course of ST. Failed trial of return to home 2021. LTC since.  # hx Right chest melanoma -- excised . Initial PET-CT negative per derm, repeat pending 22.

## 2023-04-13 PROBLEM — E46 PROTEIN-CALORIE MALNUTRITION (MULTI): Status: ACTIVE | Noted: 2023-01-01

## 2023-04-13 NOTE — LETTER
Patient: Terrell Du  : 3/6/1928    Encounter Date: 2023    Resident seen 2022 -- MP    CC: LTC ([facility]) Recheck    : 3/6/1928  NKDA  Full Code  Admit H&P done 10/11/2021    S: 89 yo man with chronic stable CHF, HTN, GERD, COPD, diffuse OA, RLS. No cp, sob per NURSING REPORT. Med List & Problem List reviewed.    O: VSS AFEB Wt 148# (3/6/23 Off O2! Awake, alert, NAD> NAD. Chest CTA. Heart rrr (PPM) 2/6 LUCAS. 4+/5 MS upper extremities. 3-/5 LE bilaterally.    LAB (10/12/22) Alb 3.6, Cr 1.51, GFR 43, Hgb 12, Na 147->144, K 4.5    COGNITIVE:  SLUMS  ->   MOCA  ->     A/P: 2+ chronic conditions, med mgmt  # COPD: stable on duoneb, mucinex, OFF O2, continue I.S., Acupella, appreciate Dr Morales's pulmonary expertise.  # HTN: up despite Norvasc 10 mg. Resume ARB/losartan 50 mg daily 3/9/23. Check cmp 3/20.  # FEN/PCM: weight down since off supplement. Continue to Push fluids for hypernatremia.  # Muscle Weakness and OA: no longer able to safely walk with walker. See mobility eval 2022.  # CHF: stable on lasix 20 mg bid. Weekly wts. 2gm Na. F/U with cardiology.  # Hx Recurrent UTI/Prostatitis 2022: resolved.  # GERD: PPI  # RLS: stable on Requip 3 mg at hs.  # OA: fish oil per patient request, routine Tylenol 500 2 tid.  # MCI: MOCA/SLUMS improved from  to  with course of ST. Failed trial of return to home 2021. LTC since.  # hx Right chest melanoma -- excised . Initial PET-CT negative per derm, repeat pending 22.      Electronically Signed By: Víctor Garcia MD   23  6:21 PM

## 2023-04-16 NOTE — PROGRESS NOTES
Resident seen 2022 -- MP    CC: LTC ([facility]) Recheck    : 3/6/1928  NKDA  Full Code  Admit H&P done 10/11/2021    S: 89 yo man with chronic stable CHF, HTN, GERD, COPD, diffuse OA, RLS. No cp, sob per NURSING REPORT. Med List & Problem List reviewed.    O: VSS AFEB Wt 148# (3/6/23 Off O2! Awake, alert, NAD> NAD. Chest CTA. Heart rrr (PPM) 2/6 LUCAS. 4+/5 MS upper extremities. 3-/5 LE bilaterally.    LAB (10/12/22) Alb 3.6, Cr 1.51, GFR 43, Hgb 12, Na 147->144, K 4.5    COGNITIVE:  SLUMS  ->   MOCA  ->     A/P: 2+ chronic conditions, med mgmt  # COPD: stable on duoneb, mucinex, OFF O2, continue I.S., Acupella, appreciate Dr Morales's pulmonary expertise.  # HTN: up despite Norvasc 10 mg. Resume ARB/losartan 50 mg daily 3/9/23. Check cmp 3/20.  # FEN/PCM: weight down since off supplement. Continue to Push fluids for hypernatremia.  # Muscle Weakness and OA: no longer able to safely walk with walker. See mobility eval 2022.  # CHF: stable on lasix 20 mg bid. Weekly wts. 2gm Na. F/U with cardiology.  # Hx Recurrent UTI/Prostatitis 2022: resolved.  # GERD: PPI  # RLS: stable on Requip 3 mg at hs.  # OA: fish oil per patient request, routine Tylenol 500 2 tid.  # MCI: MOCA/SLUMS improved from  to  with course of ST. Failed trial of return to home 2021. LTC since.  # hx Right chest melanoma -- excised . Initial PET-CT negative per derm, repeat pending 22.

## 2023-05-04 NOTE — LETTER
Patient: Terrell Du  : 3/6/1928    Encounter Date: 2023    Resident seen 2022 -- MP    CC: LTC ([facility]) Recheck    : 3/6/1928  NKDA  Full Code  Admit H&P done 10/11/2021    S: 89 yo man with chronic stable CHF, HTN, GERD, COPD, diffuse OA, RLS, and Hx Skin Melanoma. No cp, sob per NURSING REPORT. Med List & Problem List reviewed.    O: VSS AFEB Wt 153# (up 5#) Off routine O2! Awake, alert, NAD. Chest CTA. Heart rrr (PPM) 2/6 LUCAS. 4+/5 MS upper extremities. 3-/5 LE bilaterally. Right neck black macule with central papule.    LAB (23) Cr 1.82->2.09, GFR 43->34->29, Hgb 12.5->12.9, Na 143->136, K 4.3->4.4, Alb 3.7->3.6,    COGNITIVE:  SLUMS  ->   MOCA  ->     A/P: 2+ chronic conditions, med mgmt  # hx Right chest melanoma IIC -- excised . Initial PET-CT negative per derm, repeat PET scheduled 22 normal. Saw Oncololgy EUGENIO Santiago -- Unable to repeat due to generalized weakness. Recommends Derm referral for removal for New black palpable nodule left chest wall. Recommends nephro consult for new CKD4, and prior to next OV 10/25/23 needs repeact CBC, CMP, iron panel (Ferritin, Iron/TIBC), B12, and flow cytometry.  # COPD: stable on duoneb, mucinex, OFF O2, continue I.S., Acupella, appreciate pulmonary help.  # CKD4: stop ARB. Consult Dr Blevins Nephrology.  # Anemia: stable. f/u with iron studies 10/2023.  # HTN: stable on Norvasc 10 mg and ARB/losartan 50 mg daily started on 3/9/23 -- but with worsening Renal function 2023 -- DC losartan 23.  # FEN/Hx PCM: gaining weight. Off Supplement. Push po fluids.  # Muscle Weakness and OA: no longer able to safely walk with walker. See mobility eval 2022.  # CHF: stable on lasix 20 mg bid. Weekly wts. 2gm Na. F/U with cardiology.  # Hx Recurrent UTI/Prostatitis 2022: resolved.  # GERD: PPI  # RLS: stable on Requip 3 mg at hs.  # OA: fish oil per patient request, routine Tylenol 500 2 tid.  # MCI: MOCA/SLUMS  improved from 19/30 to 24/30 with course of ST. Failed trial of return to home 6/2021. LTC since.      Electronically Signed By: Víctor Garcia MD   5/7/23 10:03 PM

## 2023-05-05 PROBLEM — C43.9 MALIGNANT MELANOMA (MULTI): Status: ACTIVE | Noted: 2023-01-01

## 2023-05-05 PROBLEM — N18.4 CKD (CHRONIC KIDNEY DISEASE) STAGE 4, GFR 15-29 ML/MIN (MULTI): Status: ACTIVE | Noted: 2023-01-01

## 2023-05-05 NOTE — PROGRESS NOTES
Resident seen 2022 -- MP    CC: LTC ([facility]) Recheck    : 3/6/1928  NKDA  Full Code  Admit H&P done 10/11/2021    S: 89 yo man with chronic stable CHF, HTN, GERD, COPD, diffuse OA, RLS, and Hx Skin Melanoma. No cp, sob per NURSING REPORT. Med List & Problem List reviewed.    O: VSS AFEB Wt 153# (up 5#) Off routine O2! Awake, alert, NAD. Chest CTA. Heart rrr (PPM) 2/6 LUCAS. 4+/5 MS upper extremities. 3-/5 LE bilaterally. Right neck black macule with central papule.    LAB (23) Cr 1.82->2.09, GFR 43->34->29, Hgb 12.5->12.9, Na 143->136, K 4.3->4.4, Alb 3.7->3.6,    COGNITIVE:  SLUMS  ->   MOCA  ->     A/P: 2+ chronic conditions, med mgmt  # hx Right chest melanoma IIC -- excised . Initial PET-CT negative per derm, repeat PET scheduled 22 normal. Saw Oncololgy EUGENIO Santiago -- Unable to repeat due to generalized weakness. Recommends Derm referral for removal for New black palpable nodule left chest wall. Recommends nephro consult for new CKD4, and prior to next OV 10/25/23 needs repeact CBC, CMP, iron panel (Ferritin, Iron/TIBC), B12, and flow cytometry.  # COPD: stable on duoneb, mucinex, OFF O2, continue I.S., Acupella, appreciate pulmonary help.  # CKD4: stop ARB. Consult Dr Blevins Nephrology.  # Anemia: stable. f/u with iron studies 10/2023.  # HTN: stable on Norvasc 10 mg and ARB/losartan 50 mg daily started on 3/9/23 -- but with worsening Renal function 2023 -- DC losartan 23.  # FEN/Hx PCM: gaining weight. Off Supplement. Push po fluids.  # Muscle Weakness and OA: no longer able to safely walk with walker. See mobility eval 2022.  # CHF: stable on lasix 20 mg bid. Weekly wts. 2gm Na. F/U with cardiology.  # Hx Recurrent UTI/Prostatitis 2022: resolved.  # GERD: PPI  # RLS: stable on Requip 3 mg at hs.  # OA: fish oil per patient request, routine Tylenol 500 2 tid.  # MCI: MOCA/SLUMS improved from  to  with course of ST. Failed trial of return to home  6/2021. LTC since.

## 2023-06-08 NOTE — PROGRESS NOTES
Resident seen 2022 -- MP    CC: LT ([facility]) Recheck    : 3/6/1928  NKDA  Full Code  Admit H&P done 10/11/2021    S: 89 yo man with chronic stable CHF, HTN, GERD, COPD, diffuse OA, RLS, and Hx Skin Melanoma. No cp, sob per NURSING REPORT. Med List & Problem List reviewed.    O: VSS AFEB Wt 154# (up 1#) Off routine O2! Awake, alert, NAD. Chest CTA. Heart rrr (PPM) 2/6 LUCAS. 4+/5 MS upper extremities. 3-/5 LE bilaterally. Right neck black macule with central papule.    LAB (23) Cr 1.829, Hgb 13.3, K 4.6, Alb 4.1, Iron 296, B12 543    COGNITIVE:  SLUMS  ->   MOCA  ->     A/P:  # hx Right chest melanoma IIC -- excised . Initial PET-CT negative per derm, repeat PET scheduled 22 normal. Saw Oncololgy EUGENIO Santiago -- Unable to repeat due to generalized weakness. Recommends Derm referral for removal for New black palpable nodule left chest wall. Recommends nephro consult for new CKD4, and prior to next OV 10/25/23 needs repeact CBC, CMP, iron panel (Ferritin, Iron/TIBC), B12, and flow cytometry.  # COPD: stable on duoneb, mucinex, OFF O2, continue I.S., Acupella, appreciate pulmonary help.  # CKD4: stop ARB. Consult Dr Blevins Nephrology.  # Anemia: resolved. Normal iron/B12.  # HTN: stable on Norvasc 10 mg. Off ARB d/t CKD4.  # FEN/Hx PCM: gaining weight. Off Supplement. Push po fluids.  # Muscle Weakness and OA: no longer able to safely walk with walker. See mobility eval 2022.  # CHF: stable on lasix 20 mg bid. Weekly wts. 2gm Na. F/U with cardiology.  # Hx Recurrent UTI/Prostatitis 2022: resolved.  # GERD: PPI  # RLS: stable on Requip 3 mg at hs.  # OA: fish oil per patient request, routine Tylenol 500 2 tid.  # MCI: MOCA/SLUMS improved from  to  with course of ST. Failed trial of return to home 2021. LTC since.

## 2023-06-08 NOTE — LETTER
Patient: Terrell Du  : 3/6/1928    Encounter Date: 2023    Resident seen 2022 -- MP    CC: LTC ([facility]) Recheck    : 3/6/1928  NKDA  Full Code  Admit H&P done 10/11/2021    S: 89 yo man with chronic stable CHF, HTN, GERD, COPD, diffuse OA, RLS, and Hx Skin Melanoma. No cp, sob per NURSING REPORT. Med List & Problem List reviewed.    O: VSS AFEB Wt 154# (up 1#) Off routine O2! Awake, alert, NAD. Chest CTA. Heart rrr (PPM) 2/6 LUCAS. 4+/5 MS upper extremities. 3-/5 LE bilaterally. Right neck black macule with central papule.    LAB (23) Cr 1.829, Hgb 13.3, K 4.6, Alb 4.1, Iron 296, B12 543    COGNITIVE:  SLUMS  ->   MOCA  ->     A/P:  # hx Right chest melanoma IIC -- excised . Initial PET-CT negative per derm, repeat PET scheduled 22 normal. Saw Oncololgy PAC Jack -- Unable to repeat due to generalized weakness. Recommends Derm referral for removal for New black palpable nodule left chest wall. Recommends nephro consult for new CKD4, and prior to next OV 10/25/23 needs repeact CBC, CMP, iron panel (Ferritin, Iron/TIBC), B12, and flow cytometry.  # COPD: stable on duoneb, mucinex, OFF O2, continue I.S., Acupella, appreciate pulmonary help.  # CKD4: stop ARB. Consult Dr Blevins Nephrology.  # Anemia: resolved. Normal iron/B12.  # HTN: stable on Norvasc 10 mg. Off ARB d/t CKD4.  # FEN/Hx PCM: gaining weight. Off Supplement. Push po fluids.  # Muscle Weakness and OA: no longer able to safely walk with walker. See mobility eval 2022.  # CHF: stable on lasix 20 mg bid. Weekly wts. 2gm Na. F/U with cardiology.  # Hx Recurrent UTI/Prostatitis 2022: resolved.  # GERD: PPI  # RLS: stable on Requip 3 mg at hs.  # OA: fish oil per patient request, routine Tylenol 500 2 tid.  # MCI: MOCA/SLUMS improved from  to  with course of ST. Failed trial of return to home 2021. LTC since.      Electronically Signed By: Víctor Garcia MD   23  6:08 PM

## 2023-07-13 NOTE — LETTER
Patient: Terrell Du  : 3/6/1928    Encounter Date: 2023    Resident seen 2022 -- MP    CC: LTC ([facility]) Recheck    : 3/6/1928  NKDA  Full Code  Admit H&P done 10/11/2021    S: 89 yo man with chronic stable CHF, HTN, GERD, COPD, diffuse OA, RLS, and Hx Skin Melanoma. No cp, sob per NURSING REPORT. pedals bike 60 minutes a day! Med List & Problem List reviewed.    O: VSS AFEB Wt 144# (down 10#) Off routine O2! Awake, alert, NAD. Chest CTA. Heart rrr (PPM) 2/6 LUCAS. 4+/5 MS upper extremities. 3-/5 LE bilaterally. Right neck black macule with central papule.    LAB (23) Cr 1.829, Hgb 13.3, K 4.6, Alb 4.1, Iron 296, B12 543    COGNITIVE:  SLUMS  ->   MOCA  ->     A/P:  # hx Right chest melanoma IIC -- excised . Initial PET-CT negative per derm, repeat PET scheduled 22 normal. Saw Oncololgy EUGENIO Santiago -- Unable to repeat PET due to generalized weakness. Derm appt scheduled 23 for removal of New black palpable nodule left chest wall. Recommends nephro consult for new CKD4, and prior to next OV 10/25/23 needs repeat CBC, CMP, iron panel (Ferritin, Iron/TIBC), B12, and flow cytometry.  # COPD: stable on duoneb, mucinex, OFF O2, continue I.S., Acupella, appreciate pulmonary help.  # CKD4: stop ARB. Consult Dr Blevins Nephrology.  # Anemia: resolved. Normal iron/B12.  # HTN: stable on Norvasc 10 mg. Off ARB d/t CKD4.  # FEN/Hx PCM: gaining weight. Off Supplement. Push po fluids.  # Muscle Weakness and OA: no longer able to safely walk with walker. See mobility eval 2022.  # CHF: stable on lasix 20 mg bid. Weekly wts. 2gm Na. F/U with cardiology.  # Hx Recurrent UTI/Prostatitis 2022: resolved.  # GERD: PPI  # RLS: stable on Requip 3 mg at hs.  # OA: fish oil per patient request, routine Tylenol 500 2 tid.  # MCI: MOCA/SLUMS improved from  to  with course of ST. Failed trial of return to home 2021. LTC since.      Electronically Signed By: Víctor MURILLO  MD Radha   7/13/23  2:48 PM

## 2023-07-13 NOTE — PROGRESS NOTES
Resident seen 2022 -- MP    CC: [facility] ([facility]) Recheck    : 3/6/1928  NKDA  Full Code  Admit H&P done 10/11/2021    S: 89 yo man with chronic stable CHF, HTN, GERD, COPD, diffuse OA, RLS, and Hx Skin Melanoma. No cp, sob per NURSING REPORT. pedals bike 60 minutes a day! Med List & Problem List reviewed.    O: VSS AFEB Wt 144# (down 10#) Off routine O2! Awake, alert, NAD. Chest CTA. Heart rrr (PPM) 2/6 LUCAS. 4+/5 MS upper extremities. 3-/5 LE bilaterally. Right neck black macule with central papule.    LAB (23) Cr 1.829, Hgb 13.3, K 4.6, Alb 4.1, Iron 296, B12 543    COGNITIVE:  SLUMS  ->   MOCA  ->     A/P:  # hx Right chest melanoma IIC -- excised . Initial PET-CT negative per derm, repeat PET scheduled 22 normal. Saw Oncololgy PAC Jack -- Unable to repeat PET due to generalized weakness. Derm appt scheduled 23 for removal of New black palpable nodule left chest wall. Recommends nephro consult for new CKD4, and prior to next OV 10/25/23 needs repeat CBC, CMP, iron panel (Ferritin, Iron/TIBC), B12, and flow cytometry.  # COPD: stable on duoneb, mucinex, OFF O2, continue I.S., Acupella, appreciate pulmonary help.  # CKD4: stop ARB. Consult Dr Blevins Nephrology.  # Anemia: resolved. Normal iron/B12.  # HTN: stable on Norvasc 10 mg. Off ARB d/t CKD4.  # FEN/Hx PCM: gaining weight. Off Supplement. Push po fluids.  # Muscle Weakness and OA: no longer able to safely walk with walker. See mobility eval 2022.  # CHF: stable on lasix 20 mg bid. Weekly wts. 2gm Na. F/U with cardiology.  # Hx Recurrent UTI/Prostatitis 2022: resolved.  # GERD: PPI  # RLS: stable on Requip 3 mg at hs.  # OA: fish oil per patient request, routine Tylenol 500 2 tid.  # MCI: MOCA/SLUMS improved from  to  with course of ST. Failed trial of return to home 2021. LTC since.

## 2023-07-17 NOTE — PROGRESS NOTES
*Provider Impression*  Patient is a 89 year old male who is seen today for management of multiple medical problems  #Hematochezia / GERD - protonix 40mg daily, check hemoccult x2, CBC, CMP, iron TIBC ferritin  #COPD - combivent 20/100mcg 1 puff 4x/day, mucinex ER 600mg BID, cetirizine 10mg daily, flonase 1 spray daily, repeat CXR 2v, add prednisone 40mg daily x3d  #CHF / HLD / HTN / CKD - furosemide 20mg BID, amlodipine 10mg daily, fish oil 1000mg BID  #RLS - ropinirole 3mg daily, acetaminophen 650mg q4h PRN  Follow up as needed      *Chief Complaint*  hematochezia     *History of Present Illness*  Pt is a 89 y/o male LTC resident of [facility] as below who is seen today for f/u and management of multiple medical problems.    Nursing staff called to report blood in stool.    He is seen sitting up in his room today and denies any f/c, sweats, abd pain, diarrhea, constipation, some urinary frequency, CP, SOB, cough, no straining, and he did not note any blood previously. No other new c/o presently.     Allergies - NKA  PMH - HTN, carotid artery stenosis, COPD, CHF, HLD, T2DM,   PSH - AV replacement, carotid thromboendarterectomy, cataract surgery, hip replacement, pacemaker, shoulder surgery, tonsillectomy  FH - father had CAD  SocHx - Former smoker, Former EtOH      *Review of Systems*  All other systems reviewed are negative except as noted in the HPI     *Vitals*  Date: 7/9/23 - T: 98.0 P: 65 R: 19 BP: 136/76 SpO2: 92% on O2      *Results/Data*  CBC - Date: 5/26/23 WBC: 8.35 HGB: 13.3 HCT: 43.1 PLT: 107 ;   BMP - Date: 5/26/22 Na: 140 K: 4.6 Cl: 102 CO2: 26 BUN: 56 Cr: 1.82 Glu: 108 Ca: 9.1 ;   LFT - Date: 5/26/23 AST: 12 ALT: <5 ALP: 74 TBili: 0.4 ;   Coags - Date: INR: PT:   Other - Date: 5/26/23 - Iron: 70  TIBC: 295  B12: 543    *Physical Exam*  Gen: (+) NAD, (+) well-appearing  HEENT: (+) normocephalic, (+) MMM  Neck: (+) supple  Lungs: (+) CTAB, (-) wheezes, (-) rales, (-) rhonchi  Heart: (+) RRR, (+)  S1 S2, (-) murmurs  Pulses: (+) palpable  Abd: (+) soft, (+) NT, (+) ND, (+) BS+  Ext: (-) edema, (-) deformity  MSK: (-) joint swelling  Skin: (+) warm, (+) dry, (-) rash  Neuro: (+) follows commands, (-) tremor, (+) alert

## 2023-08-10 PROBLEM — R63.4 WEIGHT LOSS: Status: ACTIVE | Noted: 2023-01-01

## 2023-08-10 NOTE — LETTER
Patient: Terrell Du  : 3/6/1928    Encounter Date: 08/10/2023    Resident seen 8/10/2022 -- MP    CC: LTC ([facility]) Recheck    : 3/6/1928  NKDA  Full Code  Admit H&P done 10/11/2021    S: 89 yo man with chronic stable CHF, HTN, GERD, COPD, diffuse OA, RLS, and Hx Skin Melanoma. No cp, sob per NURSING REPORT. pedals bike 60 minutes a day! Weight down despite cutting back on lasix. Med List & Problem List reviewed.    O: VSS AFEB Wt 142# (down 12# in 2 months) Off routine O2! Awake, alert, NAD. Dry mm. Chest CTA. Heart rrr (PPM) 2/6 LUCAS. No c/c/e. 4+/5 MS upper extremities. 3-/5 LE bilaterally. Right neck black macule with central papule.    LAB (23) BMP Pending. (23) Alb 4.1->3.7, Hgb 13, Cr 1.86, Na 140, K 4.3 Iron 296->51, %Sat 22.3,Ferritin 103 (23) B12 543    COGNITIVE:  SLUMS  ->   MOCA  ->     A/P:  # hx Right chest melanoma IIC -- excised . Initial PET-CT negative per derm, repeat PET scheduled 22 normal. Saw Oncololgy EUGENIO Santiago -- Unable to repeat PET due to generalized weakness. Derm appt scheduled 23 for removal of New black palpable nodule left chest wall. Recommends nephro consult for new CKD4, and prior to next OV 10/25/23 needs repeat CBC, CMP, iron panel (Ferritin, Iron/TIBC), B12, and flow cytometry.  # COPD: stable on duoneb, mucinex, OFF O2, continue I.S., Acupella, appreciate pulmonary help.  # CKD4: stop ARB. Consult Dr Blevins Nephrology.  # Anemia: resolved. Normal ferritin.  # HTN: stable on Norvasc 10 mg. Off ARB d/t CKD4.  # FEN/Hx PCM: losing weight -- dc torsemide 8/10/23 and monitor weight. Boost plus Supplement restsrted 23. Push po fluids.  # Muscle Weakness and OA: no longer able to safely walk with walker. See mobility eval 2022.  # CHF: Stop loop diuretic 8/10/23. Continue aldactone. Weekly wts. 2gm Na. F/U with cardiology.  # Hx Recurrent UTI/Prostatitis 2022: resolved.  # GERD: PPI  # RLS: stable on Requip 3 mg  at hs.  # OA: fish oil per patient request, routine Tylenol 500 2 tid.  # MCI: MOCA/SLUMS improved from 19/30 to 24/30 with course of ST. Failed trial of return to home 6/2021. LTC since.      Electronically Signed By: Víctor Garcia MD   8/10/23  4:56 PM

## 2023-08-10 NOTE — PROGRESS NOTES
Resident seen 8/10/2022 -- MP    CC: LTC ([facility]) Recheck    : 3/6/1928  NKDA  Full Code  Admit H&P done 10/11/2021    S: 89 yo man with chronic stable CHF, HTN, GERD, COPD, diffuse OA, RLS, and Hx Skin Melanoma. No cp, sob per NURSING REPORT. pedals bike 60 minutes a day! Weight down despite cutting back on lasix. Med List & Problem List reviewed.    O: VSS AFEB Wt 142# (down 12# in 2 months) Off routine O2! Awake, alert, NAD. Dry mm. Chest CTA. Heart rrr (PPM) 2/6 LUCAS. No c/c/e. 4+/5 MS upper extremities. 3-/5 LE bilaterally. Right neck black macule with central papule.    LAB (23) BMP Pending. (23) Alb 4.1->3.7, Hgb 13, Cr 1.86, Na 140, K 4.3 Iron 296->51, %Sat 22.3,Ferritin 103 (23) B12 543    COGNITIVE:  SLUMS  ->   MOCA  ->     A/P:  # hx Right chest melanoma IIC -- excised . Initial PET-CT negative per derm, repeat PET scheduled 22 normal. Saw Oncololgy EUGENIO Santiago -- Unable to repeat PET due to generalized weakness. Derm appt scheduled 23 for removal of New black palpable nodule left chest wall. Recommends nephro consult for new CKD4, and prior to next OV 10/25/23 needs repeat CBC, CMP, iron panel (Ferritin, Iron/TIBC), B12, and flow cytometry.  # COPD: stable on duoneb, mucinex, OFF O2, continue I.S., Acupella, appreciate pulmonary help.  # CKD4: stop ARB. Consult Dr Blevins Nephrology.  # Anemia: resolved. Normal ferritin.  # HTN: stable on Norvasc 10 mg. Off ARB d/t CKD4.  # FEN/Hx PCM: losing weight -- dc torsemide 8/10/23 and monitor weight. Boost plus Supplement restsrted 23. Push po fluids.  # Muscle Weakness and OA: no longer able to safely walk with walker. See mobility eval 2022.  # CHF: Stop loop diuretic 8/10/23. Continue aldactone. Weekly wts. 2gm Na. F/U with cardiology.  # Hx Recurrent UTI/Prostatitis 2022: resolved.  # GERD: PPI  # RLS: stable on Requip 3 mg at hs.  # OA: fish oil per patient request, routine Tylenol 500 2 tid.  #  MCI: MOCA/SLUMS improved from 19/30 to 24/30 with course of ST. Failed trial of return to home 6/2021. LTC since.

## 2023-08-21 NOTE — LETTER
Patient: Terrell Du  : 3/6/1928    Encounter Date: 2023    Resident seen 2022 -- MP    CC: LTC ([facility]) Recheck    : 3/6/1928  NKDA  Full Code  Admit H&P done 10/11/2021    S: 89 yo man with chronic stable CHF, HTN, GERD, COPD, diffuse OA, RLS, and Hx Skin Melanoma. No cp, sob per NURSING REPORT. pedals bike 60 minutes a day! Slowly gaining weight off lasix. Med List & Problem List reviewed.    O: VSS AFEB Wt 145# (up 3# since off Lasix) Off routine O2! Awake, alert, NAD. Dry mm. Chest CTA. Heart rrr (PPM) 2/6 LUCAS. No c/c/e. 4+/5 MS upper extremities. 3-/5 LE bilaterally. Right neck black macule with central papule.    LAB (23) Cr 1.86->1.39, GFR 47, Na 138, K 5.8  (23) Alb 4.1->3.7, Hgb 13, Iron 296->51, %Sat 22.3,Ferritin 103 (23) B12 543    COGNITIVE:  SLUMS  ->   MOCA  ->     A/P:  # COPD: stable on duoneb, mucinex, OFF O2, continue I.S., Acupella, appreciate pulmonary help.  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 Consult w Dr [facility] pending.  # Anemia: resolved. Normal ferritin.  # HTN: stable on Norvasc 10 mg. Off ARB d/t CKD4.  # FEN/Hx PCM: gaining weight since off torsemide 8/10/23. Boost plus Supplement restarted 23. DC aldactone d/t hyperkalemia.  # Muscle Weakness and OA: no longer able to safely walk with walker. See mobility eval 2022.  # CHF: Stopped loop diuretic 8/10/23. Stopped aldactone 23 d/t hyperK. Weekly wts. 2gm Na. F/U with cardiology.  # Hx Recurrent UTI/Prostatitis 2022: resolved.  # GERD: PPI  # RLS: stable on Requip 3 mg at hs.  # OA: fish oil per patient request, routine Tylenol 500 2 tid.  # MCI: MOCA/SLUMS improved from  to  with course of ST. Failed trial of return to home 2021. LTC since.  # hx Right chest melanoma IIC -- excised . Initial PET-CT negative per derm, repeat PET scheduled 22 normal. Saw Oncololgy PAC Jack -- Unable to repeat PET due to generalized weakness.  Derm appt scheduled 8/11/23 for removal of New black palpable nodule left chest wall. Recommends nephro consult for new CKD4, and prior to next OV 10/25/23 needs repeat CBC, CMP, iron panel (Ferritin, Iron/TIBC), B12, and flow cytometry.      Electronically Signed By: Víctor Garcia MD   8/22/23  5:17 PM

## 2023-08-22 NOTE — PROGRESS NOTES
Resident seen 2022 -- MP    CC: LTC ([facility]) Recheck    : 3/6/1928  NKDA  Full Code  Admit H&P done 10/11/2021    S: 89 yo man with chronic stable CHF, HTN, GERD, COPD, diffuse OA, RLS, and Hx Skin Melanoma. No cp, sob per NURSING REPORT. pedals bike 60 minutes a day! Slowly gaining weight off lasix. Med List & Problem List reviewed.    O: VSS AFEB Wt 145# (up 3# since off Lasix) Off routine O2! Awake, alert, NAD. Dry mm. Chest CTA. Heart rrr (PPM) 2/6 LUCAS. No c/c/e. 4+/5 MS upper extremities. 3-/5 LE bilaterally. Right neck black macule with central papule.    LAB (23) Cr 1.86->1.39, GFR 47, Na 138, K 5.8  (23) Alb 4.1->3.7, Hgb 13, Iron 296->51, %Sat 22.3,Ferritin 103 (23) B12 543    COGNITIVE:  SLUMS  ->   MOCA  ->     A/P:  # COPD: stable on duoneb, mucinex, OFF O2, continue I.S., Acupella, appreciate pulmonary help.  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 Consult w Dr [facility] pending.  # Anemia: resolved. Normal ferritin.  # HTN: stable on Norvasc 10 mg. Off ARB d/t CKD4.  # FEN/Hx PCM: gaining weight since off torsemide 8/10/23. Boost plus Supplement restarted 23. DC aldactone d/t hyperkalemia.  # Muscle Weakness and OA: no longer able to safely walk with walker. See mobility eval 2022.  # CHF: Stopped loop diuretic 8/10/23. Stopped aldactone 23 d/t hyperK. Weekly wts. 2gm Na. F/U with cardiology.  # Hx Recurrent UTI/Prostatitis 2022: resolved.  # GERD: PPI  # RLS: stable on Requip 3 mg at hs.  # OA: fish oil per patient request, routine Tylenol 500 2 tid.  # MCI: MOCA/SLUMS improved from  to  with course of ST. Failed trial of return to home 2021. LTC since.  # hx Right chest melanoma IIC -- excised . Initial PET-CT negative per derm, repeat PET scheduled 22 normal. Saw Oncololgy EUGENIO Santiago -- Unable to repeat PET due to generalized weakness. Derm appt scheduled 23 for removal of New black palpable nodule left  chest wall. Recommends nephro consult for new CKD4, and prior to next OV 10/25/23 needs repeat CBC, CMP, iron panel (Ferritin, Iron/TIBC), B12, and flow cytometry.

## 2023-09-14 PROBLEM — I50.43 ACUTE ON CHRONIC COMBINED SYSTOLIC AND DIASTOLIC CONGESTIVE HEART FAILURE (MULTI): Status: ACTIVE | Noted: 2023-01-01

## 2023-09-14 NOTE — PROGRESS NOTES
Resident seen 2022 -- MP    CC: LTC ([facility]) Recheck    : 3/6/1928  NKDA  Full Code  Admit H&P done 10/11/2021    S: 89 yo man with chronic stable CHF, HTN, GERD, COPD, diffuse OA, RLS, and Hx Skin Melanoma. No cp. Interval dyspnea with improvement after restarting lasix 23. pedals bike 60 minutes a day! Med List & Problem List reviewed.    O: VSS AFEB Wt 142->165# (up 23# off lasix) Awake, alert, NAD. Dry mm. Chest CTA. Heart rrr (PPM) 2/6 LUCAS. No c/c/e. 4+/5 MS upper extremities. 3-/5 LE bilaterally. Right neck black macule with central papule.    LAB (23) BMP pending.  (23) Cr 1.86->1.22, K 5.2, GFR 55, Na 141  (23) Alb 4.1->3.7, Hgb 13, Iron 296->51, %Sat 22.3, Ferritin 103 (23) B12 543    COGNITIVE:  SLUMS  ->   MOCA  ->     CXR (23) mild CHF    A/P:  # hx Right chest melanoma IIC -- excised . Initial PET-CT negative per derm, repeat PET scheduled 22 normal. Saw Oncololgy EUGENIO Santiago -- Unable to repeat PET due to generalized weakness. Derm appt scheduled 23 for removal of New black palpable nodule left chest wall. Recommends nephro consult for new CKD4, and prior to next OV 10/25/23 needs repeat CBC, CMP, iron panel (Ferritin, Iron/TIBC), B12, and flow cytometry.  # COPD: stable on duoneb, mucinex, OFF O2, continue I.S., Acupella, appreciate pulmonary help.  # CKD4: stop ARB. Consult Dr [facility]ins [facility].  # Anemia: resolved. Normal ferritin.  # HTN: stable on Norvasc 10 mg. Off ARB d/t CKD4.  # FEN/Hx PCM: losing weight -- up 23# since off torsemide 8/10/23. Resumed lasix 20 mg 23 d/t CHF exac. Boost plus Supplement restarted 23. Start  cc FR 23.  # Muscle Weakness and OA: no longer able to safely walk with walker. See mobility eval 2022.  # CHF: Up 23# since off loop diuretic 8/10/23. Started lasix 20 mg 23 -- hold for weight below 150#. Continue aldactone. Daily weights x 2 weeks then resume Weekly wts. 2gm Na.  F/U with cardiology.  # Hx Recurrent UTI/Prostatitis 9/2022: resolved.  # GERD: PPI  # RLS: stable on Requip 3 mg at hs.  # OA: fish oil per patient request, routine Tylenol 500 2 tid.  # MCI: MOCA/SLUMS improved from 19/30 to 24/30 with course of ST. Failed trial of return to home 6/2021. LTC since.

## 2023-09-14 NOTE — LETTER
Patient: Terrell Du  : 3/6/1928    Encounter Date: 2023    Resident seen 2022 -- MP    CC: LTC ([facility]) Recheck    : 3/6/1928  NKDA  Full Code  Admit H&P done 10/11/2021    S: 89 yo man with chronic stable CHF, HTN, GERD, COPD, diffuse OA, RLS, and Hx Skin Melanoma. No cp. Interval dyspnea with improvement after restarting lasix 23. pedals bike 60 minutes a day! Med List & Problem List reviewed.    O: VSS AFEB Wt 142->165# (up 23# off lasix) Awake, alert, NAD. Dry mm. Chest CTA. Heart rrr (PPM) 2/6 LUCAS. No c/c/e. 4+/5 MS upper extremities. 3-/5 LE bilaterally. Right neck black macule with central papule.    LAB (23) BMP pending.  (23) Cr 1.86->1.22, K 5.2, GFR 55, Na 141  (23) Alb 4.1->3.7, Hgb 13, Iron 296->51, %Sat 22.3, Ferritin 103 (23) B12 543    COGNITIVE:  SLUMS  ->   MOCA  -> 24    CXR (23) mild CHF    A/P:  # hx Right chest melanoma IIC -- excised . Initial PET-CT negative per derm, repeat PET scheduled 22 normal. Saw Oncololgy PAC Jack -- Unable to repeat PET due to generalized weakness. Derm appt scheduled 23 for removal of New black palpable nodule left chest wall. Recommends nephro consult for new CKD4, and prior to next OV 10/25/23 needs repeat CBC, CMP, iron panel (Ferritin, Iron/TIBC), B12, and flow cytometry.  # COPD: stable on duoneb, mucinex, OFF O2, continue I.S., Acupella, appreciate pulmonary help.  # CKD4: stop ARB. Consult Dr [facility].  # Anemia: resolved. Normal ferritin.  # HTN: stable on Norvasc 10 mg. Off ARB d/t CKD4.  # FEN/Hx PCM: losing weight -- up 23# since off torsemide 8/10/23. Resumed lasix 20 mg 23 d/t CHF exac. Boost plus Supplement restarted 23. Start  cc FR 23.  # Muscle Weakness and OA: no longer able to safely walk with walker. See mobility eval 2022.  # CHF: Up 23# since off loop diuretic 8/10/23. Started lasix 20 mg 23 -- hold for weight below 150#.  Continue aldactone. Daily weights x 2 weeks then resume Weekly wts. 2gm Na. F/U with cardiology.  # Hx Recurrent UTI/Prostatitis 9/2022: resolved.  # GERD: PPI  # RLS: stable on Requip 3 mg at hs.  # OA: fish oil per patient request, routine Tylenol 500 2 tid.  # MCI: MOCA/SLUMS improved from 19/30 to 24/30 with course of ST. Failed trial of return to home 6/2021. LTC since.      Electronically Signed By: Víctor Garcia MD   9/17/23  5:55 PM

## 2023-09-18 PROBLEM — I50.9 CONGESTIVE HEART FAILURE (MULTI): Status: ACTIVE | Noted: 2023-01-01

## 2023-09-18 NOTE — PROGRESS NOTES
Resident seen 2022 -- MP    CC: LTC ([facility]) Recheck    : 3/6/1928  NKDA  Full Code  Admit H&P done 10/11/2021    S: 89 yo man with chronic stable CHF, HTN, GERD, COPD, diffuse OA, RLS, and Hx Skin Melanoma. No cp. Pedals bike 60 minutes a day! Med List & Problem List reviewed.    O: VSS AFEB Wt 142->165->156# (down 9# since lasix restarted 23) Awake, alert, NAD. Chest CTA. Heart rrr (PPM) 2/6 LUCAS. No c/c/e. 4+/5 MS upper extremities. 3-/5 LE bilaterally. Right neck black macule with central papule.    LAB (23) BMP pending.  (23) Cr 1.86->1.22, K 5.2, GFR 55, Na 141  (23) Alb 4.1->3.7, Hgb 13, Iron 296->51, %Sat 22.3, Ferritin 103 (23) B12 543    COGNITIVE:  SLUMS  ->   MOCA  ->     CXR (23) mild CHF    A/P:  # hx Right chest melanoma IIC -- excised . Initial PET-CT negative per derm, repeat PET scheduled 22 normal. Saw Oncololgy EUGENIO Santiago -- Unable to repeat PET due to generalized weakness. Derm appt scheduled 23 for removal of New black palpable nodule left chest wall. Recommends nephro consult for new CKD4, and prior to next OV 10/25/23 needs repeat CBC, CMP, iron panel (Ferritin, Iron/TIBC), B12, and flow cytometry.  # COPD: stable on duoneb, mucinex, OFF O2, continue I.S., Acupella, appreciate pulmonary help.  # CKD4: stop ARB. Consult Dr [facility].  # Anemia: resolved. Normal ferritin.  # HTN: stable on Norvasc 10 mg. Off ARB d/t CKD4.  # FEN/Hx PCM: losing weight -- up 23# since off torsemide 8/10/23. Resumed lasix 20 mg 23 d/t CHF exac. Boost plus Supplement restarted 23. Start  cc FR 23.  # Muscle Weakness and OA: no longer able to safely walk with walker. See mobility eval 2022.  # CHF: Tolerating lasix 20 mg 23 -- hold for weight below 150#. Continue aldactone. Daily weights x 2 weeks then resume Weekly wts. 2gm Na. F/U with cardiology.  # Hx Recurrent UTI/Prostatitis 2022: resolved.  # GERD:  PPI  # RLS: stable on Requip 3 mg at hs.  # OA: fish oil per patient request, routine Tylenol 500 2 tid.  # MCI: MOCA/SLUMS improved from 19/30 to 24/30 with course of ST. Failed trial of return to home 6/2021. LTC since.

## 2023-09-18 NOTE — LETTER
Patient: Terrell Du  : 3/6/1928    Encounter Date: 2023    Resident seen 2022 -- MP    CC: LTC ([facility]) Recheck    : 3/6/1928  NKDA  Full Code  Admit H&P done 10/11/2021    S: 89 yo man with chronic stable CHF, HTN, GERD, COPD, diffuse OA, RLS, and Hx Skin Melanoma. No cp. Pedals bike 60 minutes a day! Med List & Problem List reviewed.    O: VSS AFEB Wt 142->165->156# (down 9# since lasix restarted 23) Awake, alert, NAD. Chest CTA. Heart rrr (PPM) 2/6 LUCAS. No c/c/e. 4+/5 MS upper extremities. 3-/5 LE bilaterally. Right neck black macule with central papule.    LAB (23) BMP pending.  (23) Cr 1.86->1.22, K 5.2, GFR 55, Na 141  (23) Alb 4.1->3.7, Hgb 13, Iron 296->51, %Sat 22.3, Ferritin 103 (23) B12 543    COGNITIVE:  SLUMS  ->   MOCA  ->     CXR (23) mild CHF    A/P:  # hx Right chest melanoma IIC -- excised . Initial PET-CT negative per derm, repeat PET scheduled 22 normal. Saw Oncololgy EUGENIO Santiago -- Unable to repeat PET due to generalized weakness. Derm appt scheduled 23 for removal of New black palpable nodule left chest wall. Recommends nephro consult for new CKD4, and prior to next OV 10/25/23 needs repeat CBC, CMP, iron panel (Ferritin, Iron/TIBC), B12, and flow cytometry.  # COPD: stable on duoneb, mucinex, OFF O2, continue I.S., Acupella, appreciate pulmonary help.  # CKD4: stop ARB. Consult Dr [facility].  # Anemia: resolved. Normal ferritin.  # HTN: stable on Norvasc 10 mg. Off ARB d/t CKD4.  # FEN/Hx PCM: losing weight -- up 23# since off torsemide 8/10/23. Resumed lasix 20 mg 23 d/t CHF exac. Boost plus Supplement restarted 23. Start  cc FR 23.  # Muscle Weakness and OA: no longer able to safely walk with walker. See mobility eval 2022.  # CHF: Tolerating lasix 20 mg 23 -- hold for weight below 150#. Continue aldactone. Daily weights x 2 weeks then resume Weekly wts. 2gm Na. F/U with  cardiology.  # Hx Recurrent UTI/Prostatitis 9/2022: resolved.  # GERD: PPI  # RLS: stable on Requip 3 mg at hs.  # OA: fish oil per patient request, routine Tylenol 500 2 tid.  # MCI: MOCA/SLUMS improved from 19/30 to 24/30 with course of ST. Failed trial of return to home 6/2021. LTC since.      Electronically Signed By: Víctor Garcia MD   9/24/23  5:43 PM

## 2023-10-12 NOTE — LETTER
"Patient: Terrell Du  : 3/6/1928    Encounter Date: 10/12/2023    Resident seen 10/12/2022 -- MP    CC: LTC ([facility]) Recheck    : 3/6/1928  NKDA  Full Code  Admit H&P done 10/11/2021    S: 89 yo man with chronic stable CHF, HTN, GERD, COPD, diffuse OA, RLS, and Hx Skin Melanoma. No cp. Pedals bike 60 minutes a day! Med List & Problem List reviewed.    O: VSS AFEB Wt 156# (stable -- down 9# since lasix restarted 23) Awake, alert, NAD. Chest CTA. Heart rrr (PPM) 2/6 LUCAS. No c/c/e. 4+/5 MS upper extremities. 3-/5 LE bilaterally. Right neck black macule with central papule.    LAB (10/9/23) Cr 1.41, K 4.8, Na 143, Alb 3.7, Hgb 13->11.9, Plt 84  (23)Iron 296->51, %Sat 22.3, Ferritin 103 (23) B12 543    COGNITIVE:  SLUMS  ->   MOCA  ->     CXR (23) mild CHF    A/P:  # hx Right chest melanoma IIC -- excised . Initial PET-CT negative per derm, repeat PET scheduled 22 normal. Saw Oncololgy EUGENIO Santiago -- Unable to repeat PET due to generalized weakness. Derm appt scheduled 23 for removal of New black palpable nodule left chest wall. Recommends nephro consult for new CKD4, and prior to next OV 10/25/23 needs repeat CBC, CMP, iron panel (Ferritin, Iron/TIBC), B12, and flow cytometry.  # COPD: stable on duoneb, mucinex, OFF O2, continue I.S., Acupella, appreciate pulmonary help.  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  # Anemia: resolved. Normal ferritin.  # HTN: stable on Norvasc 10 mg. Off ARB d/t CKD4.  # FEN/Hx PCM: Weight stable. Prostat supplement. 2000 CC FR still listed under \"diet\" but per nursing this has been discontinued.  # Muscle Weakness and OA: no longer able to safely walk with walker. See mobility eval 2022.  # CHF: Stable weight OFF diuretics. OFF FR, OFF 2gm Na. F/U with cardiology.  # Hx Recurrent UTI/Prostatitis 2022: resolved.  # GERD: PPI  # RLS: stable on Requip 3 mg at hs.  # OA: fish oil per patient request, routine Tylenol 500 2 " tid.  # MCI: MOCA/SLUMS improved from 19/30 to 24/30 with course of ST. Failed trial of return to home 6/2021. LTC since.      Electronically Signed By: Víctor Garcia MD   10/21/23  2:05 PM

## 2023-10-12 NOTE — PROGRESS NOTES
"Resident seen 10/12/2022 -- MP    CC: LTC ([facility]) Recheck    : 3/6/1928  NKDA  Full Code  Admit H&P done 10/11/2021    S: 89 yo man with chronic stable CHF, HTN, GERD, COPD, diffuse OA, RLS, and Hx Skin Melanoma. No cp. Pedals bike 60 minutes a day! Med List & Problem List reviewed.    O: VSS AFEB Wt 156# (stable -- down 9# since lasix restarted 23) Awake, alert, NAD. Chest CTA. Heart rrr (PPM) 2/6 LUCAS. No c/c/e. 4+/5 MS upper extremities. 3-/5 LE bilaterally. Right neck black macule with central papule.    LAB (10/9/23) Cr 1.41, K 4.8, Na 143, Alb 3.7, Hgb 13->11.9, Plt 84  (23)Iron 296->51, %Sat 22.3, Ferritin 103 (23) B12 543    COGNITIVE:  SLUMS  ->   MOCA  ->     CXR (23) mild CHF    A/P:  # hx Right chest melanoma IIC -- excised . Initial PET-CT negative per derm, repeat PET scheduled 22 normal. Saw Oncololgy EUGENIO Santiago -- Unable to repeat PET due to generalized weakness. Derm appt scheduled 23 for removal of New black palpable nodule left chest wall. Recommends nephro consult for new CKD4, and prior to next OV 10/25/23 needs repeat CBC, CMP, iron panel (Ferritin, Iron/TIBC), B12, and flow cytometry.  # COPD: stable on duoneb, mucinex, OFF O2, continue I.S., Acupella, appreciate pulmonary help.  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  # Anemia: resolved. Normal ferritin.  # HTN: stable on Norvasc 10 mg. Off ARB d/t CKD4.  # FEN/Hx PCM: Weight stable. Prostat supplement. 2000 CC FR still listed under \"diet\" but per nursing this has been discontinued.  # Muscle Weakness and OA: no longer able to safely walk with walker. See mobility eval 2022.  # CHF: Stable weight OFF diuretics. OFF FR, OFF 2gm Na. F/U with cardiology.  # Hx Recurrent UTI/Prostatitis 2022: resolved.  # GERD: PPI  # RLS: stable on Requip 3 mg at hs.  # OA: fish oil per patient request, routine Tylenol 500 2 tid.  # MCI: MOCA/SLUMS improved from  to  with course of ST. Failed " trial of return to home 6/2021. LTC since.

## 2023-10-16 NOTE — LETTER
"Patient: Terrell Du  : 3/6/1928    Encounter Date: 10/16/2023    Resident seen 10/16/2022 -- MP    CC: LTC ([facility]) Recheck    : 3/6/1928  NKDA  Full Code  Admit H&P done 10/11/2021    S: 89 yo man with chronic stable CHF, HTN, GERD, COPD, diffuse OA, RLS, and Hx Skin Melanoma. No cp. Pedals bike 60 minutes a day! Med List & Problem List reviewed.    O: VSS AFEB Wt 156# (stable -- down 9# since lasix restarted 23) Awake, alert, NAD. Chest CTA. Heart rrr (PPM) 2/6 LUCAS. No c/c/e. 4+/5 MS upper extremities. 3-/5 LE bilaterally. Right neck black macule with central papule.    LAB (10/16/23) Cr 1.36, K 4.7, Na 143, Alb 3.5, TSH, 2.36  (10/9/23) Hgb 13->11.9, Plt 84  (23)Iron 296->51, %Sat 22.3, Ferritin 103 (23) B12 543    COGNITIVE:  SLUMS  ->   MOCA  ->     CXR (23) mild CHF    A/P:  # hx Right chest melanoma IIC -- excised . Initial PET-CT negative per derm, repeat PET scheduled 22 normal. Saw Oncololgy EUGENIO Santiago -- Unable to repeat PET due to generalized weakness. Derm appt scheduled 23 for removal of New black palpable nodule left chest wall. Recommends nephro consult for new CKD4, and prior to next OV 10/25/23 needs repeat CBC, CMP, iron panel (Ferritin, Iron/TIBC), B12, and flow cytometry.  # COPD: stable on duoneb, mucinex, OFF O2, continue I.S., Acupella, appreciate pulmonary help.  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  # Anemia: resolved. Normal ferritin.  # HTN: stable on Norvasc 10 mg. Off ARB d/t CKD4.  # FEN/Hx PCM: Weight stable. Prostat supplement. 2000 CC FR still listed under \"diet\" but per nursing this has been discontinued.  # Muscle Weakness and OA: no longer able to safely walk with walker. See mobility eval 2022.  # CHF: Stable weight OFF diuretics. OFF FR, OFF 2gm Na. F/U with cardiology.  # Hx Recurrent UTI/Prostatitis 2022: resolved.  # GERD: PPI  # RLS: stable on Requip 3 mg at hs.  # OA: fish oil per patient request, " routine Tylenol 500 2 tid.  # MCI: MOCA/SLUMS improved from 19/30 to 24/30 with course of ST. Failed trial of return to home 6/2021. LTC since.      Electronically Signed By: Víctor Garcia MD   10/21/23  2:05 PM

## 2023-10-19 NOTE — PROGRESS NOTES
"Resident seen 10/16/2022 -- MP    CC: LTC ([facility]) Recheck    : 3/6/1928  NKDA  Full Code  Admit H&P done 10/11/2021    S: 89 yo man with chronic stable CHF, HTN, GERD, COPD, diffuse OA, RLS, and Hx Skin Melanoma. No cp. Pedals bike 60 minutes a day! Med List & Problem List reviewed.    O: VSS AFEB Wt 156# (stable -- down 9# since lasix restarted 23) Awake, alert, NAD. Chest CTA. Heart rrr (PPM) 2/6 LUCAS. No c/c/e. 4+/5 MS upper extremities. 3-/5 LE bilaterally. Right neck black macule with central papule.    LAB (10/16/23) Cr 1.36, K 4.7, Na 143, Alb 3.5, TSH, 2.36  (10/9/23) Hgb 13->11.9, Plt 84  (23)Iron 296->51, %Sat 22.3, Ferritin 103 (23) B12 543    COGNITIVE:  SLUMS  ->   MOCA  ->     CXR (23) mild CHF    A/P:  # hx Right chest melanoma IIC -- excised . Initial PET-CT negative per derm, repeat PET scheduled 22 normal. Saw Oncololgy EUGENIO Santiago -- Unable to repeat PET due to generalized weakness. Derm appt scheduled 23 for removal of New black palpable nodule left chest wall. Recommends nephro consult for new CKD4, and prior to next OV 10/25/23 needs repeat CBC, CMP, iron panel (Ferritin, Iron/TIBC), B12, and flow cytometry.  # COPD: stable on duoneb, mucinex, OFF O2, continue I.S., Acupella, appreciate pulmonary help.  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  # Anemia: resolved. Normal ferritin.  # HTN: stable on Norvasc 10 mg. Off ARB d/t CKD4.  # FEN/Hx PCM: Weight stable. Prostat supplement. 2000 CC FR still listed under \"diet\" but per nursing this has been discontinued.  # Muscle Weakness and OA: no longer able to safely walk with walker. See mobility eval 2022.  # CHF: Stable weight OFF diuretics. OFF FR, OFF 2gm Na. F/U with cardiology.  # Hx Recurrent UTI/Prostatitis 2022: resolved.  # GERD: PPI  # RLS: stable on Requip 3 mg at hs.  # OA: fish oil per patient request, routine Tylenol 500 2 tid.  # MCI: MOCA/SLUMS improved from  to  with " course of ST. Failed trial of return to home 6/2021. LTC since.

## 2023-11-09 NOTE — LETTER
"Patient: Terrell Du  : 3/6/1928    Encounter Date: 2023    Resident seen 2022 -- MP    CC: LTC ([facility]) Recheck    : 3/6/1928  NKDA  Full Code  Admit H&P done 10/11/2021    S: 89 yo man with chronic stable CHF, HTN, GERD, COPD, diffuse OA, RLS, and Hx Skin Melanoma. No cp. Pedals bike 60 minutes a day! Med List & Problem List reviewed.    O: VSS AFEB Wt 159# (up 3#) Awake, alert, NAD. Chest CTA. Heart rrr (PPM) 2/6 LUCAS. No c/c/e. 4+/5 MS upper extremities. 3-/5 LE bilaterally. Right neck black macule with central papule.    LAB (10/17/23) Cr 1.36, GFR 48, K 4.7, Na 143, Alb 3.5, TSH 2.36,   (10/9/23) Hgb 13->11.9, Plt 84  (23)Iron 296->51, %Sat 22.3, Ferritin 103 (23) B12 543    COGNITIVE:  SLUMS  ->   MOCA  ->     CXR (23) mild CHF    A/P:  # COPD: stable on duoneb, mucinex, OFF O2, continue I.S., Acupella, appreciate pulmonary help.  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  # Anemia: resolved. Normal ferritin.  # HTN: stable on Norvasc 10 mg. Off ARB d/t CKD4.  # FEN/Hx PCM: Weight stable. Prostat supplement. 2000 CC FR still listed under \"diet\" but per nursing this has been discontinued.  # Muscle Weakness and OA: no longer able to safely walk with walker. See mobility eval 2022.  # CHF: Stable weight OFF diuretics. OFF FR, OFF 2gm Na. F/U with cardiology.  # Hx Recurrent UTI/Prostatitis 2022: resolved.  # GERD: PPI  # RLS: stable on Requip 3 mg at hs.  # OA: fish oil per patient request, routine Tylenol 500 2 tid.  # MCI: MOCA/SLUMS improved from  to  with course of ST. Failed trial of return to home 2021. LTC since.  # hx Right chest melanoma IIC -- excised . Initial PET-CT negative per derm, repeat PET scheduled 22 normal. Saw Oncololgy EUGENIO Santiago -- Unable to repeat PET due to generalized weakness. Derm appt scheduled 23 for removal of New black palpable nodule left chest wall. Recommends nephro consult for new CKD4, " and prior to next OV 10/25/23 needs repeat CBC, CMP, iron panel (Ferritin, Iron/TIBC), B12, and flow cytometry.      Electronically Signed By: Víctor Garcia MD   11/24/23 11:18 AM

## 2023-11-13 NOTE — PROGRESS NOTES
"Resident seen 2022 -- MP    CC: LTC ([facility]) Recheck    : 3/6/1928  NKDA  Full Code  Admit H&P done 10/11/2021    S: 89 yo man with chronic stable CHF, HTN, GERD, COPD, diffuse OA, RLS, and Hx Skin Melanoma. No cp. Pedals bike 60 minutes a day! Med List & Problem List reviewed.    O: VSS AFEB Wt 159# (up 3#) Awake, alert, NAD. Chest CTA. Heart rrr (PPM) 2/6 LUCAS. No c/c/e. 4+/5 MS upper extremities. 3-/5 LE bilaterally. Right neck black macule with central papule.    LAB (10/17/23) Cr 1.36, GFR 48, K 4.7, Na 143, Alb 3.5, TSH 2.36,   (10/9/23) Hgb 13->11.9, Plt 84  (23)Iron 296->51, %Sat 22.3, Ferritin 103 (23) B12 543    COGNITIVE:  SLUMS  ->   MOCA  ->     CXR (23) mild CHF    A/P:  # COPD: stable on duoneb, mucinex, OFF O2, continue I.S., Acupella, appreciate pulmonary help.  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  # Anemia: resolved. Normal ferritin.  # HTN: stable on Norvasc 10 mg. Off ARB d/t CKD4.  # FEN/Hx PCM: Weight stable. Prostat supplement. 2000 CC FR still listed under \"diet\" but per nursing this has been discontinued.  # Muscle Weakness and OA: no longer able to safely walk with walker. See mobility eval 2022.  # CHF: Stable weight OFF diuretics. OFF FR, OFF 2gm Na. F/U with cardiology.  # Hx Recurrent UTI/Prostatitis 2022: resolved.  # GERD: PPI  # RLS: stable on Requip 3 mg at hs.  # OA: fish oil per patient request, routine Tylenol 500 2 tid.  # MCI: MOCA/SLUMS improved from  to  with course of ST. Failed trial of return to home 2021. LTC since.  # hx Right chest melanoma IIC -- excised . Initial PET-CT negative per derm, repeat PET scheduled 22 normal. Saw Oncololgy EUGENIO Santiago -- Unable to repeat PET due to generalized weakness. Derm appt scheduled 23 for removal of New black palpable nodule left chest wall. Recommends nephro consult for new CKD4, and prior to next OV 10/25/23 needs repeat CBC, CMP, iron panel (Ferritin, " Iron/TIBC), B12, and flow cytometry.

## 2023-11-15 PROBLEM — N17.9 ACUTE KIDNEY FAILURE (CMS-HCC): Status: ACTIVE | Noted: 2023-01-01

## 2023-11-15 PROBLEM — Z86.39 HISTORY OF TYPE 2 DIABETES MELLITUS: Status: ACTIVE | Noted: 2023-01-01

## 2023-11-15 PROBLEM — M48.00 SPINAL STENOSIS: Status: ACTIVE | Noted: 2023-01-01

## 2023-11-15 PROBLEM — R09.02 HYPOXIA: Status: ACTIVE | Noted: 2023-01-01

## 2023-11-15 PROBLEM — R26.89 BALANCE PROBLEM: Status: ACTIVE | Noted: 2023-01-01

## 2023-11-15 PROBLEM — Z86.79 HISTORY OF CAROTID ARTERY STENOSIS: Status: ACTIVE | Noted: 2023-01-01

## 2023-11-15 PROBLEM — N18.9 CHRONIC KIDNEY DISEASE: Status: ACTIVE | Noted: 2023-01-01

## 2023-11-15 PROBLEM — M62.81 MUSCLE WEAKNESS (GENERALIZED): Status: ACTIVE | Noted: 2023-01-01

## 2023-11-15 PROBLEM — R26.2 DISABILITY OF WALKING: Status: ACTIVE | Noted: 2023-01-01

## 2023-11-15 PROBLEM — Z86.79 HISTORY OF HEART FAILURE: Status: ACTIVE | Noted: 2023-01-01

## 2023-11-15 PROBLEM — C43.59 MALIGNANT MELANOMA OF SKIN OF CHEST (MULTI): Status: ACTIVE | Noted: 2022-09-12

## 2023-11-15 PROBLEM — Z86.39 HISTORY OF ELEVATED LIPIDS: Status: ACTIVE | Noted: 2023-01-01

## 2023-11-15 PROBLEM — D61.818 PANCYTOPENIA (MULTI): Status: ACTIVE | Noted: 2023-01-01

## 2023-11-15 PROBLEM — Z86.69 HISTORY OF BELL'S PALSY: Status: ACTIVE | Noted: 2023-01-01

## 2023-11-15 PROBLEM — M81.0 AGE-RELATED OSTEOPOROSIS WITHOUT CURRENT PATHOLOGICAL FRACTURE: Status: ACTIVE | Noted: 2023-01-01

## 2023-11-22 NOTE — PROGRESS NOTES
Patient ID: Terrell Du is a 89 y.o. male.    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  He was evaluated by Dr Johnson and decision made to forego Keytruda.  PET scan 9/12/22 FRANCO.  Patient had  quite a bit of difficulty getting on exam table and getting through exam and would prefer to have no further surveillance.    He also has mild anemia and thrombocytopenia which he has opted not to work up at this time.  He has kidney disease with BUN/crt  of 58/1.82  He originally was wheelchair bound due to weakness, but is now on stretcher.  POA states physical therapy and strengthening is not an option.     Objective    BSA: There is no height or weight on file to calculate BSA.  /62 (BP Location: Right arm)   Pulse 62   Temp 36.7 °C (98.1 °F)   SpO2 96%      Physical Exam  Limited due to being on stretcher.    No lymphadenopathy head and neck  Lateral lung fields clear, heart RR  Chest wall with scar from melenoma removal.  No lymphadenopathy noted axilla or supraclavicular  Heart RR  Abd nontender  Pain RLE due to leg being straightened on the stretcher    Performance Status:  Bedridden      Assessment/Plan     Melenoma IIC 2021, recommended axillary ultrasounds not being done.  No lymph nodes palpable    Anemia improved with hgb 12.1 and platelets improved at 105.  Patient previously deferred work up and labs are better  Will recheck in 6 months and work up if needed.   Paperwork filled out for [facility] and handed to POA       Diagnoses and all orders for this visit:  Malignant melanoma of torso excluding breast (CMS/HCC)  -     CBC and Auto Differential  -     Comprehensive Metabolic Panel  -     Ferritin  -     Iron and TIBC  -     Vitamin B12  -     CBC and Auto Differential; Future  -     Comprehensive Metabolic Panel; Future  -     Ferritin; Future  -     Iron and TIBC; Future  -     Vitamin B12; Future  -     Clinic Appointment Request Follow up; Future            Genesis Santiago PA-C

## 2023-12-06 NOTE — PATIENT INSTRUCTIONS
Dear ONDINA   It was nice seeing you in the nephrology clinic today     Today we discussed the following:     #Chronic kidney disease stage III-kidney function (GFR).  Kidney function improved from 35% up to 49%-great     #Blood pressure is accepted-continue same medication amlodipine 10 mg, Lasix 20 mg.  Currently spironolactone is on hold    # Urine incontinence-will give a trial to external catheter at the nursing home     Follow-up in 6 months with repeat blood work and urinalysis prior to next     Please call our office if you have any question  Thank you for coming to see me today     May Blevins MD, MS, PETR CHILDERS  Clinical  - [employer]  Nephrologist - [facility] - [facility]

## 2023-12-06 NOTE — PROGRESS NOTES
"Subjective     Mr. Thacker is a 89-year-old male with past medical history of melanoma not on active immunotherapy, CHF, hypertension, GERD on PPI, COPD and restless leg syndrome is coming to see me today initial consultation for CKD management per PCP    Last office visit May 2023.  Terrell remains to be in a nursing home.  He came today was to escorts and his niece/power of .  He is in a good mood.  No complaints or concerns.  He had repeat blood work done in November 23 that showed improved serum creatinine 1.3 from prior baseline of 2, GFR improved to 49 from prior baseline 30-40.  Blood pressure is accepted.  He complains about urine incontinence.  He will see dermatology later this week for skin scales    Per prior notes     Terrell is a nursing home resident. He came today with his niece. He is chair bound. He is awake and alert. He is pleasant. He is not significantly hypervolemic on exam today. He had his charts from [facility] sent with him which was reviewed carefully. He does not follow with nephrology. He has stage III chronic kidney disease with recent worsening and now he is back to his baseline.     Family history: Irrelevant  Social history: Lives in a nursing home, non-smoker  Surgical history: Valve replacement        Objective   /68   Pulse 58   Resp 18   Ht 1.702 m (5' 7\")   Wt 70.3 kg (155 lb)   SpO2 94%   BMI 24.28 kg/m²   Wt Readings from Last 3 Encounters:   12/06/23 70.3 kg (155 lb)   05/30/23 65.3 kg (144 lb)   09/16/22 74.8 kg (164 lb 14.5 oz)       Physical Exam    General appearance: no distress awake and alert on room air, euvolemic on exam, on stretcher  Eyes: non-icteric  HEENT: atrumatic head, PEERLA, moist mucosa  Skin: no apparent rash  Heart: NSR, S1, S2 normal, no murmur or gallop  Lungs: Symmetrical expansion,CTA bilat no wheezing/crackles  Abdomen: soft, nt/nd, obese  Extremities: Trace edema bilateral with significant scaly skin  Neuro: No FND,asterixis, no " focal deficits noticed        Review of Systems     Constitutional: no fever, no chills, no recent weight gain and no recent weight loss.   Eyes: no blurred vision and no diplopia.   ENT: no hearing loss, no earache, no sore throat, no swollen glands in the neck and no nasal discharge.   Cardiovascular: no chest pain, no palpitations and no lower extremity edema.   Respiratory: no shortness of breath, no chronic cough and no shortness of breath during exertion.   Gastrointestinal: no abdominal pain, no constipation, no heartburn, no vomiting, no bloody stools and no change in bowel movements.   Genitourinary: no dysuria and no hematuria.   Musculoskeletal: no arthralgias and no myalgias.   Skin: no rashes and no skin lesions.   Neurological: no headaches and no dizziness.   Psychiatric: no confusion, no depression and no anxiety.   Endocrine: no heat intolerance, no cold intolerance, appetite not increased, no thyroid disorder, no increased urinary frequency and no dry skin.   Hematologic/Lymphatic: does not bleed easily and does not bruise easily.   All other systems have been reviewed and are negative for complaint.         Data Review               Current Outpatient Medications:     amLODIPine (Norvasc) 10 mg tablet, Take by mouth once daily., Disp: , Rfl:     benzonatate (Tessalon) 100 mg capsule, Take 1 capsule (100 mg) by mouth 3 times a day as needed for cough. Do not crush or chew., Disp: , Rfl:     cetirizine (ZyrTEC) 10 mg chewable tablet, Chew once daily., Disp: , Rfl:     fluticasone (Flonase) 50 mcg/actuation nasal spray, Administer 1 spray into each nostril once daily. Shake gently. Before first use, prime pump. After use, clean tip and replace cap., Disp: , Rfl:     furosemide (Lasix) 20 mg tablet, Take by mouth., Disp: , Rfl:     guaiFENesin (Mucinex) 600 mg 12 hr tablet, Take 1 tablet (600 mg) by mouth 2 times a day., Disp: , Rfl:     ipratropium (Atrovent) 17 mcg/actuation inhaler, Inhale 2 puffs  once daily., Disp: , Rfl:     ipratropium-albuteroL (Combivent Respimat)  mcg/actuation inhaler, Inhale., Disp: , Rfl:     omega 3-dha-epa-fish oil (Fish OiL) 1,000 mg (120 mg-180 mg) capsule, Take by mouth., Disp: , Rfl:     pantoprazole (ProtoNix) 40 mg EC tablet, Take 1 tablet (40 mg) by mouth once daily in the morning. Take before meals., Disp: , Rfl:     rOPINIRole (Requip) 1 mg tablet, Take 3 tablets (3 mg) by mouth., Disp: , Rfl:     dextromethorphan-guaifenesin  mg/5 mL oral liquid, Take 5 mL by mouth every 4 hours if needed for cough., Disp: , Rfl:     hydroCHLOROthiazide (HYDRODiuril) 12.5 mg tablet, Take 1 tablet (12.5 mg) by mouth once daily., Disp: , Rfl:     ipratropium-albuteroL (Duo-Neb) 0.5-2.5 mg/3 mL nebulizer solution, Take 3 mL by nebulization., Disp: , Rfl:     lactobacillus acidophilus (Acidophilus) capsule, Take by mouth., Disp: , Rfl:     lisinopril 10 mg tablet, Take 1 tablet (10 mg) by mouth once daily., Disp: , Rfl:     loratadine (Claritin) 10 mg tablet, Take 1 tablet (10 mg) by mouth once daily., Disp: , Rfl:     losartan (Cozaar) 100 mg tablet, Take 1 tablet (100 mg) by mouth., Disp: , Rfl:     magnesium hydroxide (Milk of Magnesia) 400 mg/5 mL suspension, Take 30 mL by mouth as needed at bedtime for constipation., Disp: , Rfl:     melatonin 3 mg tablet, Take 1 tablet (3 mg) by mouth as needed at bedtime for sleep., Disp: , Rfl:     metOLazone (Zaroxolyn) 2.5 mg tablet, Take 1 tablet (2.5 mg) by mouth., Disp: , Rfl:     metroNIDAZOLE (Flagyl) 250 mg tablet, Take 1 tablet (250 mg) by mouth., Disp: , Rfl:     multivitamin (Multiple Vitamins) tablet, Take 1 tablet by mouth once daily., Disp: , Rfl:     nystatin (Mycostatin) 100,000 unit/gram powder, Nystatin 124068 UNIT/GM External Powder  Quantity: 60  Refills: 0      Start : 2-Feb-2022  Active, Disp: , Rfl:     rosuvastatin (Crestor) 10 mg tablet, Take 1 tablet (10 mg) by mouth once daily at bedtime., Disp: , Rfl:      "sulfamethoxazole-trimethoprim (Bactrim DS) 800-160 mg tablet, Take by mouth., Disp: , Rfl:     thiamine 100 mg tablet, Take 1 tablet (100 mg) by mouth once daily., Disp: , Rfl:     triamcinolone (Kenalog) 0.1 % cream, Apply topically., Disp: , Rfl:     triamcinolone (Kenalog) 0.1 % ointment, Apply 1 Application topically 2 times a day., Disp: , Rfl:        Lab Results   Component Value Date    HGBA1C 6.8 (A) 10/06/2021         Lab Results   Component Value Date    CALCIUM 8.7 11/22/2023    PHOS 3.3 07/28/2019         No results found for requested labs within last 365 days.              No lab exists for component: \"CR\", \"PHOSPHORUS\"          No results found for: \"MICROALBCREA\"    Recent Labs     11/22/23  1300 04/21/23  1150 09/16/22  1037 10/09/21  0601 10/08/21  0646 10/07/21  0710 10/06/21  0738 07/29/19  0620 07/28/19  0605    136 140 138 138 140 142   < > 146*   K 4.6 4.4 4.4 4.8 4.7 5.0 4.8   < > 3.9   CO2 33* 27 33* 27 28 28 27   < > 24   BUN 36* 86* 45* 70* 67* 57* 47*   < > 40*   GLUCOSE 109* 118* 111* 171* 173* 168* 167*   < > 121*   CALCIUM 8.7 9.0 9.4 8.6 8.6 8.8 8.8   < > 9.4   PHOS  --   --   --   --   --   --   --   --  3.3   CREATININE 1.33* 2.09* 1.50* 1.87* 2.21* 2.05* 1.70*   < > 1.57*   EGFR 49*  --   --   --   --   --   --   --   --    ANIONGAP 10 11 10 13 13 12 13   < > 12    < > = values in this interval not displayed.          Assessment and Plan        Mr. Thacker is a 89-year-old male with past medical history of melanoma not on active immunotherapy, CHF, hypertension, GERD on PPI, COPD and restless leg syndrome is coming to see me today initial consultation for CKD management per PCP     Impression  #CKD stage IIIb most likely atherosclerotic cardiovascular disease  -Baseline serum creatinine 1.5-2, GFR 30-40 mill per minute from 2023 mÂ². Most recent blood work done in November 2023 at the nursing home was reviewed and showed serum creatinine 1.3, GFR 49 which is better than " his baseline-most likely due to holding RAAS inhibitors.  Will continue to hold  -Accepted volume status today  -Could not leave urine sample today to check for albuminuria     #Hypertension-in target  -Continue same medication  amlodipine 10 mg, Lasix 20 mg.  Aldactone 25 mg has been held-I am hesitant to restart.  I think risks overweigh benefits at this time-will continue to hold     #No significant anemia hemoglobin 12-13     #Remote history of melanoma-Keytruda is on hold. Following conservative measures    # Urine incontinence-possible applying external catheter at the nursing home     Follow-up in 6 months with repeat blood work and urinalysis    On behalf of the [facility] Nephrology and hypertension department, I appreciate you allowing us to be part of patient ONDINA [facility] care team with you      It took me 20 minutes to review chart from nursing home. 35 minutes encounter with patient. 5 minutes charting notes total of 60 minutes encounter     May Blevins MD, MS, PETR CHILDERS  Clinical  - [employer]  Nephrologist - [facility] - [facility]

## 2023-12-08 NOTE — PROGRESS NOTES
Subjective     Terrell Du is a 89 y.o. male who presents for the following: Skin Check.     Review of Systems:  No other skin or systemic complaints other than what is documented elsewhere in the note.    The following portions of the chart were reviewed this encounter and updated as appropriate:       Skin Cancer History  No skin cancer on file.    Specialty Problems          Dermatology Problems    Malignant melanoma of skin of chest (CMS/HCC)    Malignant melanoma (CMS/HCC)     Past Medical History:  Terrell Du  has a past medical history of Essential (primary) hypertension (07/31/2019), Personal history of other diseases of the circulatory system (07/31/2019), Personal history of other diseases of the circulatory system (07/31/2019), Personal history of other diseases of the nervous system and sense organs (07/31/2019), Personal history of other diseases of the respiratory system (07/31/2019), Personal history of other endocrine, nutritional and metabolic disease (07/31/2019), and Personal history of other endocrine, nutritional and metabolic disease (07/31/2019).    Past Surgical History:  Terrell Du  has a past surgical history that includes Other surgical history (07/31/2019); Other surgical history (07/31/2019); Other surgical history (07/31/2019); Other surgical history (07/31/2019); Other surgical history (07/31/2019); Other surgical history (07/31/2019); and Other surgical history (07/31/2019).    Family History:  Patient family history is not on file.    Social History:  Terrell Du  reports that he has never smoked. He has never used smokeless tobacco. No history on file for alcohol use and drug use.    Allergies:  Patient has no known allergies.    Current Medications / CAM's:    Current Outpatient Medications:     amLODIPine (Norvasc) 10 mg tablet, Take by mouth once daily., Disp: , Rfl:     benzonatate (Tessalon) 100 mg capsule, Take 1 capsule (100 mg) by mouth  3 times a day as needed for cough. Do not crush or chew., Disp: , Rfl:     cetirizine (ZyrTEC) 10 mg chewable tablet, Chew once daily., Disp: , Rfl:     dextromethorphan-guaifenesin  mg/5 mL oral liquid, Take 5 mL by mouth every 4 hours if needed for cough., Disp: , Rfl:     fluticasone (Flonase) 50 mcg/actuation nasal spray, Administer 1 spray into each nostril once daily. Shake gently. Before first use, prime pump. After use, clean tip and replace cap., Disp: , Rfl:     furosemide (Lasix) 20 mg tablet, Take by mouth., Disp: , Rfl:     guaiFENesin (Mucinex) 600 mg 12 hr tablet, Take 1 tablet (600 mg) by mouth 2 times a day., Disp: , Rfl:     hydroCHLOROthiazide (HYDRODiuril) 12.5 mg tablet, Take 1 tablet (12.5 mg) by mouth once daily., Disp: , Rfl:     ipratropium (Atrovent) 17 mcg/actuation inhaler, Inhale 2 puffs once daily., Disp: , Rfl:     ipratropium-albuteroL (Combivent Respimat)  mcg/actuation inhaler, Inhale., Disp: , Rfl:     ipratropium-albuteroL (Duo-Neb) 0.5-2.5 mg/3 mL nebulizer solution, Take 3 mL by nebulization., Disp: , Rfl:     lactobacillus acidophilus (Acidophilus) capsule, Take by mouth., Disp: , Rfl:     lisinopril 10 mg tablet, Take 1 tablet (10 mg) by mouth once daily., Disp: , Rfl:     loratadine (Claritin) 10 mg tablet, Take 1 tablet (10 mg) by mouth once daily., Disp: , Rfl:     losartan (Cozaar) 100 mg tablet, Take 1 tablet (100 mg) by mouth., Disp: , Rfl:     magnesium hydroxide (Milk of Magnesia) 400 mg/5 mL suspension, Take 30 mL by mouth as needed at bedtime for constipation., Disp: , Rfl:     melatonin 3 mg tablet, Take 1 tablet (3 mg) by mouth as needed at bedtime for sleep., Disp: , Rfl:     metOLazone (Zaroxolyn) 2.5 mg tablet, Take 1 tablet (2.5 mg) by mouth., Disp: , Rfl:     metroNIDAZOLE (Flagyl) 250 mg tablet, Take 1 tablet (250 mg) by mouth., Disp: , Rfl:     multivitamin (Multiple Vitamins) tablet, Take 1 tablet by mouth once daily., Disp: , Rfl:     nystatin  (Mycostatin) 100,000 unit/gram powder, Nystatin 051713 UNIT/GM External Powder  Quantity: 60  Refills: 0      Start : 2-Feb-2022  Active, Disp: , Rfl:     omega 3-dha-epa-fish oil (Fish OiL) 1,000 mg (120 mg-180 mg) capsule, Take by mouth., Disp: , Rfl:     pantoprazole (ProtoNix) 40 mg EC tablet, Take 1 tablet (40 mg) by mouth once daily in the morning. Take before meals., Disp: , Rfl:     rOPINIRole (Requip) 1 mg tablet, Take 3 tablets (3 mg) by mouth., Disp: , Rfl:     rosuvastatin (Crestor) 10 mg tablet, Take 1 tablet (10 mg) by mouth once daily at bedtime., Disp: , Rfl:     sulfamethoxazole-trimethoprim (Bactrim DS) 800-160 mg tablet, Take by mouth., Disp: , Rfl:     thiamine 100 mg tablet, Take 1 tablet (100 mg) by mouth once daily., Disp: , Rfl:     triamcinolone (Kenalog) 0.1 % cream, Apply topically., Disp: , Rfl:     triamcinolone (Kenalog) 0.1 % ointment, Apply 1 Application topically 2 times a day., Disp: , Rfl:      Objective   Well appearing patient in no apparent distress; mood and affect are within normal limits.    A full examination was performed including scalp, head, eyes, ears, nose, lips, neck, chest, axillae, abdomen, back, buttocks, bilateral upper extremities, bilateral lower extremities, hands, feet, fingers, toes, fingernails, and toenails. All findings within normal limits unless otherwise noted below.    Lymph Nodes  The following lymph node beds were examined: Preauricular. Postauricular. Submandib. Submental. Occipital. Cervical. Supraclav. Axillary. Epitroch. Inguinal. Popliteal.    The lymph node beds were examined bilaterally and no palpable adenopathy was noted.    Assessment/Plan   1. Neoplasm of uncertain behavior of skin (2)  Right Breast  7 x 4.5 mm irregular shape asymmetrical macule with black peppering asymmetrically              Lesion biopsy  Type of biopsy: tangential    Informed consent: discussed and consent obtained    Timeout: patient name, date of birth, surgical  site, and procedure verified    Procedure prep:  Patient was prepped and draped  Anesthesia: the lesion was anesthetized in a standard fashion    Anesthetic:  1% lidocaine plain local infiltration  Instrument used: DermaBlade    Hemostasis achieved with: electrodesiccation    Outcome: patient tolerated procedure well    Post-procedure details: wound care instructions given    Additional details:  Cleaned area with isopropyl alcohol prior to anesthesia or biopsy. Applied thin layer of vaseline and covered with bandaid after procedure      Staff Communication: Dermatology Local Anesthesia: 1 % Plain Lidocaine - Amount:0.5ml per lesion    Specimen 1 - Dermatopathology- DERM LAB  Differential Diagnosis: MM vs DN  Check Margins Yes/No?:    Comments:    Dermpath Lab: Routine Histopathology (formalin-fixed tissue)    Left Inguinal Area  6.5 x 5 mm irregular shape asymmetrical dark brown thin papule with cerebriform features and cobblestones centrally.               Staff Communication: Dermatology Local Anesthesia: 1 % Plain Lidocaine - Amount:0.5ml per lesion    Lesion biopsy  Type of biopsy: tangential    Informed consent: discussed and consent obtained    Timeout: patient name, date of birth, surgical site, and procedure verified    Procedure prep:  Patient was prepped and draped  Anesthesia: the lesion was anesthetized in a standard fashion    Anesthetic:  1% lidocaine plain local infiltration  Instrument used: DermaBlade    Hemostasis achieved with: electrodesiccation    Outcome: patient tolerated procedure well    Post-procedure details: wound care instructions given    Additional details:  Cleaned area with isopropyl alcohol prior to anesthesia or biopsy. Applied thin layer of vaseline and covered with bandaid after procedure      Specimen 2 - Dermatopathology- DERM LAB  Differential Diagnosis: MM vs DN  Check Margins Yes/No?:    Comments:    Dermpath Lab: Routine Histopathology (formalin-fixed tissue)    [facility]  -  Given uncertainty in clinical diagnosis, shave biopsy is recommended in clinic today.  - The patient expressed understanding, is in agreement with this plan, and wishes to proceed with biopsy.  - Oral and written wound care instructions provided.  - Advised patient that the office will call within 2 weeks to discuss biopsy results.      Related Procedures  Follow Up In Dermatology - Established Patient    2. Angioma of skin  Scattered cherry-red papule(s).    A cherry hemangioma is a small macule (small, flat, smooth area) or papule (small, solid bump) formed from an overgrowth of tiny blood vessels in the skin. Cherry hemangiomas are characteristically red or purplish in color. They often first appear in middle adulthood and usually increase in number with age. Cherry hemangiomas are noncancerous (benign) and are common in adults.    The present appearance of the lesion is not worrisome but it should continue to be observed and testing/treatment may be warranted if change occurs.    Related Procedures  Follow Up In Dermatology - Established Patient    3. Benign nevus  Scattered, uniform and benign-appearing, regular brown melanocytic papules and macules.    The present appearance of the lesion is not worrisome but it should continue to be observed and testing/treatment may be warranted if change occurs.    Related Procedures  Follow Up In Dermatology - Established Patient    4. Seborrheic keratosis  Stuck on verrucous, tan-brown papules and plaques.      Seborrheic keratoses are common noncancerous (benign) growths of unknown cause seen in adults due to a thickening of an area of the top skin layer. Seborrheic keratoses may appear as if they are stuck on to the skin. They have distinct borders, and they may appear as papules (small, solid bumps) or plaques (solid, raised patches that are bigger than a thumbnail). They may be the same color as your skin, or they may be pink, light brown, darker brown, or very dark  brown, or sometimes may appear black.    There is no way to prevent new seborrheic keratoses from forming. Seborrheic keratoses can be removed, but removal is considered a cosmetic issue and is usually not covered by insurance.    PLAN  No treatment is needed unless there is irritation from clothing, such as itching or bleeding.  2.   Some lotions containing alpha hydroxy acids, salicylic acid, or urea may make the areas feel smoother with regular use but will not eliminate them.    Related Procedures  Follow Up In Dermatology - Established Patient    5. Actinic keratosis (6)  Mid Frontal Scalp, Mid Parietal Scalp (3), Right Temple (2)  Thin erythematous papules with gritty scale    WHAT IS ACTINIC KERATOSIS?   - Actinic keratosis (AK) is a skin condition caused by sun damage. It causes scaly, rough, or bumpy spots on the skin.  - If left alone, AKs may turn into a skin cancer. People who burn easily or have trouble tanning are at more risk for developing AKs.   - There is no one test for AKs and diagnosis is made by clinical appearance. Treatment options include cryotherapy, therapy with lights, and various creams (e.g., topical 5-fluorocuracil, imiquimod).       To lower the chance of getting AK, you can:       ?  Stay out of the sun in the middle of the day (from 10 a.m. to 4 p.m.)       ?  Wear sunscreen - An SPF of at least 30 is best. The SPF number is on the sunscreen bottle or tube.       ?  Wear a wide-brimmed hat, long-sleeved shirt, long pants, or long skirt outside. A baseball hat does not give much protection.        ?  Do not use tanning beds.        ?  Keep a low-fat diet, less than 21% of calories should come from fat       ?  Take Vitamin B3 (nicotinomide) 500mg twice daily.      YOUR TREATMENT PLAN  - Decision was made at this time not to treat the lesions as they are not bothering him. They will return to clinic sooner if lesions ulcerate and fail to heal or change shape, size or color.      Related Procedures  Follow Up In Dermatology - Established Patient    6. Lymphedema (2)  Left Lower Leg - Anterior, Right Lower Leg - Anterior  Non pitting edema noted with skin thickening and in some areas waxy fleshtoned to white papules with some coalescing into plaques. Scattered crusty plaques    Lymphedema is a buildup of fluids in your body. Your lymph system is made of tiny vessels, lymph nodes, and organs like the tonsils or spleen. This system helps fight germs. It also helps get rid of extra fluid from the body. Sometimes, the lymph system may become damaged or blocked. This may happen because of cancer, surgery, or radiation treatments. Damage to lymph nodes can also cause problems with the lymph system. There are two types of lymphedema, primary and secondary.     PLAN    Proper skin care with skin cleansing and the use of emollients and/or barrier preparations help maintain an intact skin barrier. Prevention of dryness and fissuring, and reducing itching and scratching, are important to prevent the development of skin ulceration. Patients should gently wash their legs daily using mild nonsoap cleansers (eg, Dove, Olay, Cetaphil, Caress, Neutrogena) to remove scale, bacteria, and crusts. Products without artificial colors or fragrance may be less irritating, and petrolatum based body washes (eg, Dove, Olay, Aveeno) or in-shower skin conditioners (eg, Olay) can minimize the dryness that occurs with daily washing    Related Procedures  Follow Up In Dermatology - Established Patient    7. Personal history of malignant melanoma of skin    ABCDEs of melanoma and atypical moles were discussed with the patient.    Patient was instructed to perform monthly self skin examination.  We recommended that the patient have regular full skin exams given an increased risk of subsequent skin cancers.    The patient was instructed to use sun protective behaviors including use of broad spectrum sunscreens and sun  protective clothing to reduce risk of skin cancers.    Warning signs of non-melanoma skin cancer discussed.    Focus on care is mostly palliative per discussion with CASSIA Bailey. They do want to biopsy 2 lesions of suspicion on exam. They will follow up with Heme Onc in 6 months.     Related Procedures  Follow Up In Dermatology - Established Patient    8. Sun-damaged skin  Actinic changes in the form of freckles, lentigines and hyper/hypopigmentation     ABCDEs of melanoma and atypical moles were discussed with the patient.    Routine dental, ophtho, and gyn (if female) exams were recommended.    The patient was instructed to recommend that first degree relatives have yearly skin exams.    Patient was instructed to perform monthly self skin examination.  We recommended that the patient have regular full skin exams given an increased risk of subsequent skin cancers.    The patient was instructed to use sun protective behaviors including use of broad spectrum sunscreens and sun protective clothing to reduce risk of skin cancers.    Warning signs of non-melanoma skin cancer discussed.    Nicholas    Related Procedures  Follow Up In Dermatology - Established Patient           Return in 3 months for routine skin check or return to clinic sooner if needed

## 2023-12-08 NOTE — PATIENT INSTRUCTIONS

## 2023-12-14 PROBLEM — R06.02 SHORTNESS OF BREATH: Status: ACTIVE | Noted: 2023-01-01

## 2023-12-14 PROBLEM — J18.9 ACUTE PNEUMONIA: Status: ACTIVE | Noted: 2023-01-01

## 2023-12-14 NOTE — PROGRESS NOTES
Pharmacy Medication History Review    Terrell Du is a 89 y.o. male admitted for No Principal Problem: There is no principal problem currently on the Problem List. Please update the Problem List and refresh.. Pharmacy reviewed the patient's [id number] medications and allergies for accuracy.    The list below reflectives the updated PTA list. Please review each medication in order reconciliation for additional clarification and justification.  (Not in a hospital admission)       The list below reflectives the updated allergy list. Please review each documented allergy for additional clarification and justification.  Allergies  Reviewed by Sarahy Starr CPhT on 12/14/2023   No Known Allergies         Below are additional concerns with the patient's PTA list.      Sarahy Starr CPhT

## 2023-12-14 NOTE — CARE PLAN
The patient's goals for the shift include    Improves sp02  The clinical goals for the shift include Pt will not require any increased oxygen during this shift.    Pt oriented to room, no c/o pain safety and comfort maintained.

## 2023-12-14 NOTE — SIGNIFICANT EVENT
Called for HFNC. Pt 87% on breathing tx. Patient placed on 12LHFNC and pt desat to 84%. Patient placed on Airvo 60L 85% spo2 92%.

## 2023-12-14 NOTE — LETTER
"Patient: Terrell Du  : 3/6/1928    Encounter Date: 2023    Resident seen 2022 -- MP    CC: LTC ([facility]) Recheck    : 3/6/1928  NKDA  Full Code  Admit H&P done 10/11/2021    S: 89 yo man with chronic stable CHF, HTN, GERD, COPD, diffuse OA, RLS, and Hx Skin Melanoma. Progressive cough, dyspnea and chest pain over past 24 hours. No improvement with routine duonebs. Med List & Problem List reviewed.    O: VSS AFEB Wt 155# (down 4#) Awake, alert, mild respiratory distress with retractions. Chest wheeze and rhonchi. Heart rrr (PPM) 2/6 LUCAS. No c/c/e. 4+/5 MS upper extremities. 3-/5 LE bilaterally.    LAB (10/17/23) Cr 1.36, GFR 48, K 4.7, Na 143, Alb 3.5, TSH 2.36,   (10/9/23) Hgb 13->11.9, Plt 84  (23)Iron 296->51, %Sat 22.3, Ferritin 103 (23) B12 543    COGNITIVE:  SLUMS  ->   MOCA  ->     CXR (23) mild CHF    A/P:  # COPD w exacerbation and progressive respiratory failure: increasing o2 requirement over past 24 hours. CXR pending. Zpak and Prednisone ordered but with subsequent desaturation to 50% requiring 10 L o2. Patient is Full Code and 911 called for transport to ER for COPD exac vs Pna vs COVID with progressive respiratory failure.  # HTN: stable on Norvasc 10 mg. Off ARB d/t CKD4.  # FEN/Hx PCM: Weight stable. Prostat supplement. 2000 CC FR still listed under \"diet\" but per nursing this has been discontinued.  # Muscle Weakness and OA: no longer able to safely walk with walker. See mobility eval 2022.  # CHF: Stable weight OFF diuretics. OFF FR, OFF 2gm Na. F/U with cardiology.  # Hx Recurrent UTI/Prostatitis 2022: resolved.  # GERD: PPI  # RLS: stable on Requip 3 mg at hs.  # OA: fish oil per patient request, routine Tylenol 500 2 tid.  # MCI: MOCA/SLUMS improved from  to  with course of ST. Failed trial of return to home 2021. LTC since.  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  # Hx Anemia: resolved. Normal ferritin.  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 Initial PET-CT negative per derm, repeat PET scheduled 9/12/22 normal. Saw Oncololgy PAC Jack -- Unable to repeat PET due to generalized weakness. Derm appt scheduled 8/11/23 for removal of New black palpable nodule left chest wall. Recommends nephro consult for new CKD4, and prior to next OV 10/25/23 needs repeat CBC, CMP, iron panel (Ferritin, Iron/TIBC), B12, and flow cytometry.      Electronically Signed By: Víctor Garcia MD   1/14/24  4:11 PM

## 2023-12-14 NOTE — H&P
History Of Present Illness  Terrell Du is a 89 y.o. male presenting with CHF, HTN, GERD, COPD, diffuse OA, RLS, and Hx Skin Melanoma.  He presented to the emergency department with worsening shortness of breath.  The patient reported progressive constant shortness of breath started gradually 3 days ago to the point he is having it continuously associated with cough with minimal sputum of green color.  Review of system is significant for orthopnea.  The patient denied fever, chills, nausea, vomiting, abdominal pain, PND, chest pain, weakness, numbness, recent fall or trauma.  The patient reported that he is living in a facility and he uses a wheelchair for mobility.  He reported that he quit smoking long time ago no alcohol or illicit drug use.    ED course, chest x-ray showed congestion patient received antibiotics for possible pneumonia.  The patient will be admitted for further evaluation.       Past Medical History  Past Medical History:   Diagnosis Date    Essential (primary) hypertension 07/31/2019    Benign essential HTN    Personal history of other diseases of the circulatory system 07/31/2019    History of carotid artery stenosis    Personal history of other diseases of the circulatory system 07/31/2019    History of congestive heart failure    Personal history of other diseases of the nervous system and sense organs 07/31/2019    History of Bell's palsy    Personal history of other diseases of the respiratory system 07/31/2019    History of chronic obstructive lung disease    Personal history of other endocrine, nutritional and metabolic disease 07/31/2019    History of hyperlipidemia    Personal history of other endocrine, nutritional and metabolic disease 07/31/2019    History of type 2 diabetes mellitus       Surgical History  Past Surgical History:   Procedure Laterality Date    OTHER SURGICAL HISTORY  07/31/2019    Tonsillectomy with adenoidectomy    OTHER SURGICAL HISTORY  07/31/2019     "Carotid thromboendarterectomy    OTHER SURGICAL HISTORY  07/31/2019    Shoulder surgery    OTHER SURGICAL HISTORY  07/31/2019    Cataract surgery    OTHER SURGICAL HISTORY  07/31/2019    Pacemaker insertion    OTHER SURGICAL HISTORY  07/31/2019    Aortic valve replacement    OTHER SURGICAL HISTORY  07/31/2019    Hip replacement        Social History  He reports that he has never smoked. He has never used smokeless tobacco. No history on file for alcohol use and drug use.    Family History  No family history on file.     Allergies  Patient has no known allergies.    Review of Systems   Respiratory:  Positive for shortness of breath and wheezing.    Gastrointestinal:  Negative for abdominal distention, abdominal pain, constipation, diarrhea, nausea and vomiting.   Genitourinary:  Negative for dysuria.        Physical Exam  HENT:      Head: Normocephalic.   Cardiovascular:      Rate and Rhythm: Normal rate and regular rhythm.   Pulmonary:      Breath sounds: Wheezing and rhonchi present.   Abdominal:      General: Abdomen is flat.      Palpations: Abdomen is soft.   Musculoskeletal:         General: Normal range of motion.   Neurological:      General: No focal deficit present.      Mental Status: He is alert and oriented to person, place, and time.          Last Recorded Vitals  Blood pressure 121/67, pulse 74, temperature 37.9 °C (100.2 °F), temperature source Tympanic, resp. rate (!) 32, height 1.778 m (5' 10\"), weight 68 kg (150 lb), SpO2 92 %.    Relevant Results  Results for orders placed or performed during the hospital encounter of 12/14/23 (from the past 24 hour(s))   CBC and Auto Differential   Result Value Ref Range    WBC 8.0 4.4 - 11.3 x10*3/uL    nRBC 0.3 (H) 0.0 - 0.0 /100 WBCs    RBC 3.59 (L) 4.50 - 5.90 x10*6/uL    Hemoglobin 9.9 (L) 13.5 - 17.5 g/dL    Hematocrit 33.7 (L) 41.0 - 52.0 %    MCV 94 80 - 100 fL    MCH 27.6 26.0 - 34.0 pg    MCHC 29.4 (L) 32.0 - 36.0 g/dL    RDW 14.7 (H) 11.5 - 14.5 %    " Platelets 75 (L) 150 - 450 x10*3/uL    Neutrophils % 67.1 40.0 - 80.0 %    Immature Granulocytes %, Automated 0.3 0.0 - 0.9 %    Lymphocytes % 26.9 13.0 - 44.0 %    Monocytes % 5.5 2.0 - 10.0 %    Eosinophils % 0.1 0.0 - 6.0 %    Basophils % 0.1 0.0 - 2.0 %    Neutrophils Absolute 5.35 1.60 - 5.50 x10*3/uL    Immature Granulocytes Absolute, Automated 0.02 0.00 - 0.50 x10*3/uL    Lymphocytes Absolute 2.14 0.80 - 3.00 x10*3/uL    Monocytes Absolute 0.44 0.05 - 0.80 x10*3/uL    Eosinophils Absolute 0.01 0.00 - 0.40 x10*3/uL    Basophils Absolute 0.01 0.00 - 0.10 x10*3/uL   Basic metabolic panel   Result Value Ref Range    Glucose 133 (H) 74 - 99 mg/dL    Sodium 142 136 - 145 mmol/L    Potassium 4.8 3.5 - 5.3 mmol/L    Chloride 105 98 - 107 mmol/L    Bicarbonate 33 (H) 21 - 32 mmol/L    Anion Gap 9 (L) 10 - 20 mmol/L    Urea Nitrogen 45 (H) 6 - 23 mg/dL    Creatinine 1.75 (H) 0.50 - 1.30 mg/dL    eGFR 35 (L) >60 mL/min/1.73m*2    Calcium 8.6 8.6 - 10.3 mg/dL   Lactate   Result Value Ref Range    Lactate 0.9 0.4 - 2.0 mmol/L   Troponin I, High Sensitivity   Result Value Ref Range    Troponin I, High Sensitivity 46 (H) 0 - 20 ng/L   B-type natriuretic peptide   Result Value Ref Range     (H) 0 - 99 pg/mL   Blood Gas Venous Full Panel   Result Value Ref Range    POCT pH, Venous 7.40 7.33 - 7.43 pH    POCT pCO2, Venous 53 (H) 41 - 51 mm Hg    POCT pO2, Venous 39 35 - 45 mm Hg    POCT SO2, Venous 61 45 - 75 %    POCT Oxy Hemoglobin, Venous 59.5 45.0 - 75.0 %    POCT Hematocrit Calculated, Venous 34.0 (L) 41.0 - 52.0 %    POCT Sodium, Venous 139 136 - 145 mmol/L    POCT Potassium, Venous 4.7 3.5 - 5.3 mmol/L    POCT Chloride, Venous 108 (H) 98 - 107 mmol/L    POCT Ionized Calicum, Venous 1.14 1.10 - 1.33 mmol/L    POCT Glucose, Venous 143 (H) 74 - 99 mg/dL    POCT Lactate, Venous 1.1 0.4 - 2.0 mmol/L    POCT Base Excess, Venous 6.7 (H) -2.0 - 3.0 mmol/L    POCT HCO3 Calculated, Venous 32.8 (H) 22.0 - 26.0 mmol/L     POCT Hemoglobin, Venous 11.2 (L) 13.5 - 17.5 g/dL    POCT Anion Gap, Venous 3.0 (L) 10.0 - 25.0 mmol/L    Patient Temperature      FiO2 21 %   MRSA Surveillance for Vancomycin De-escalation, PCR    Specimen: Anterior Nares; Swab   Result Value Ref Range    MRSA PCR Not Detected Not Detected   Sars-CoV-2 and Influenza A/B PCR   Result Value Ref Range    Flu A Result Not Detected Not Detected    Flu B Result Not Detected Not Detected    Coronavirus 2019, PCR Not Detected Not Detected   ECG 12 lead   Result Value Ref Range    Ventricular Rate 78 BPM    Atrial Rate 78 BPM    MA Interval 248 ms    QRS Duration 156 ms    QT Interval 452 ms    QTC Calculation(Bazett) 515 ms    P Axis 37 degrees    R Axis 56 degrees    T Axis 63 degrees    QRS Count 13 beats    Q Onset 205 ms    T Offset 431 ms    QTC Fredericia 493 ms   Troponin I, High Sensitivity   Result Value Ref Range    Troponin I, High Sensitivity 49 (H) 0 - 20 ng/L   Iron and TIBC   Result Value Ref Range    Iron 14 (L) 35 - 150 ug/dL    UIBC 250 110 - 370 ug/dL    TIBC 264 240 - 445 ug/dL    % Saturation 5 (L) 25 - 45 %   Urinalysis with Reflex Microscopic and Culture   Result Value Ref Range    Color, Urine Yellow Straw, Yellow    Appearance, Urine Clear Clear    Specific Gravity, Urine 1.016 1.005 - 1.035    pH, Urine 5.0 5.0, 5.5, 6.0, 6.5, 7.0, 7.5, 8.0    Protein, Urine 100 (2+) (N) NEGATIVE mg/dL    Glucose, Urine NEGATIVE NEGATIVE mg/dL    Blood, Urine NEGATIVE NEGATIVE    Ketones, Urine NEGATIVE NEGATIVE mg/dL    Bilirubin, Urine NEGATIVE NEGATIVE    Urobilinogen, Urine <2.0 <2.0 mg/dL    Nitrite, Urine NEGATIVE NEGATIVE    Leukocyte Esterase, Urine NEGATIVE NEGATIVE   Extra Urine Gray Tube   Result Value Ref Range    Extra Tube Hold for add-ons.    Urinalysis Microscopic   Result Value Ref Range    WBC, Urine 1-5 1-5, NONE /HPF    RBC, Urine 1-2 NONE, 1-2, 3-5 /HPF    Bacteria, Urine 1+ (A) NONE SEEN /HPF      ECG 12 lead    Result Date: 12/14/2023  Sinus  rhythm with 1st degree AV block Left bundle branch block Abnormal ECG When compared with ECG of 05-OCT-2021 18:23, Sinus rhythm has replaced Electronic ventricular pacemaker    XR chest 1 view    Result Date: 12/14/2023  Interpreted By:  Darrell May, STUDY: XR CHEST 1 VIEW;  12/14/2023 9:09 am   INDICATION: Signs/Symptoms:cough; fever; hypoxia.   COMPARISON: Chest x-ray from 10/09/2021. CT scan chest from 10/05/2021.   ACCESSION NUMBER(S): [id number]   ORDERING CLINICIAN: GANESH MADRID   TECHNIQUE: Single AP portable view of the chest was obtained.   FINDINGS: MEDIASTINUM/ LUNGS/ GUSTAVO: Underlying elevation of the left diaphragm. This shows progression. There are pleural and parenchymal opacities in the left lung. Right lung shows mild vascular congestion and perhaps a tiny right pleural effusion. No mass or pneumonia in the right lung. Previous heart surgery including valve replacement. Cardiomegaly. Stable cardiac pacemaker on the left.   No pneumothorax. No tracheal deviation.     BONES: No lytic or blastic destructive bone lesion. There are spinal fixation rods and pedicle screws in place in the lumbar spine. Previous reverse right shoulder arthroplasty, grossly intact.   UPPER ABDOMEN: Grossly intact.       Previous heart surgery including valve replacement. Stable left-sided cardiac pacemaker.   Underlying elevation of the left diaphragm, progressed.   Cardiomegaly with vascular congestion. Left pleural effusion with associated left-sided atelectasis. Question tiny right pleural effusion.   Signed by: Darrell May 12/14/2023 9:24 AM Dictation workstation:   [id number]       Assessment/Plan   Principal Problem:    Acute pneumonia  Active Problems:    Shortness of breath    Terrell Du is a 89 y.o. male presenting with CHF, HTN, GERD, COPD, diffuse OA, RLS, and Hx Skin Melanoma.  He presented to the emergency department with worsening shortness of breath.    # Acute hypoxic resp failure likely CHF  exacerbation, COPD excebation vs PNA less likely   - on AIRVO  - Labs no leukocytosis, Hgb 99, MCV 94, PLTs 75, Cr 1.75  -  -Trop 46 >> 49  -ECG LBBB, no previous echo to compare    Plan:  -Admit to Med/Surg  -Cardiac diet  -Tele  -ECHO ordered  -VQ scan ordered  -Lasix 40 BID IV  -Strict I/Os  -Daily WT   -Maintain neg balance  -Duoneb Q4  -Bronchial hygiene  -Started on methylprednisone   -ProCal ordered  -Pt received abx in the ED  -Cardiology consulted rec are appreciated  -Consider Pulm consult    # CKD  -Cr 1.7 around baseline  -Nephrology follow-up as oupt    #Thrombocytopenia  -PLTs 75 (baseline 80)  -Monitor  -Heme follow-as out pt    #Anemia  -Workup ordered     #Chronic medical problems  -Continue home medications with parameters  -Continue breathing treatments and PPI    #Disposition  -Pending improvement of symptoms and cardiology evaluation          Gavino Antonio MD

## 2023-12-14 NOTE — ED PROVIDER NOTES
Terrell ROBLES [facility]  89 y.o.    HPI  Presents to the ED with for possible pneumonia.  Patient presents from [facility] at [place].  Patient has a history of hypertension, dementia and malignant melanoma.  Patient does report that he has been coughing for the past 3 days.  Coughing up some sputum.  A little bit of shortness of breath.  No chest pain.  Unsure about fevers.  He denies vomiting.  Denies abdominal pain and diarrhea.  Really does not provide any further meaningful history.      Patient History   Past Medical History:   Diagnosis Date    Essential (primary) hypertension 07/31/2019    Benign essential HTN    Personal history of other diseases of the circulatory system 07/31/2019    History of carotid artery stenosis    Personal history of other diseases of the circulatory system 07/31/2019    History of congestive heart failure    Personal history of other diseases of the nervous system and sense organs 07/31/2019    History of Bell's palsy    Personal history of other diseases of the respiratory system 07/31/2019    History of chronic obstructive lung disease    Personal history of other endocrine, nutritional and metabolic disease 07/31/2019    History of hyperlipidemia    Personal history of other endocrine, nutritional and metabolic disease 07/31/2019    History of type 2 diabetes mellitus     Past Surgical History:   Procedure Laterality Date    OTHER SURGICAL HISTORY  07/31/2019    Tonsillectomy with adenoidectomy    OTHER SURGICAL HISTORY  07/31/2019    Carotid thromboendarterectomy    OTHER SURGICAL HISTORY  07/31/2019    Shoulder surgery    OTHER SURGICAL HISTORY  07/31/2019    Cataract surgery    OTHER SURGICAL HISTORY  07/31/2019    Pacemaker insertion    OTHER SURGICAL HISTORY  07/31/2019    Aortic valve replacement    OTHER SURGICAL HISTORY  07/31/2019    Hip replacement     No family history on file.  Social History     Tobacco Use    Smoking status: Never    Smokeless tobacco: Never  "  Substance Use Topics    Alcohol use: Not on file    Drug use: Not on file       Physical Exam   Vitals:    12/14/23 0844   BP: 128/57   BP Location: Right arm   Patient Position: Standing   Pulse: 75   Resp: (!) 28   Temp: 37.9 °C (100.2 °F)   TempSrc: Tympanic   SpO2: (!) 83%   Weight: 68 kg (150 lb)   Height: 1.778 m (5' 10\")        Constitutional: Chronically ill-appearing  Eyes: Cataract in the right eye, no discharge  HEENT: Atraumatic. External ears appear normal, Nose is without drainage, slightly dry, no intraoral lesions  Neck: Normal ROM, No lymphadenopathy, No stridor  Cardiac: Regular rhythm and rate  Pulmonary/Chest: Does have slight increased work of breathing with increased respiratory rate.  Wheezes and rhonchi bilaterally, more so on the right than the left.  No chest tenderness  Abdomen: BS present, Soft, Non-tender without rebound/guarding  Back: No tenderness, No contusions  Extremities: Normal ROM, No edema. No tenderness, intact distal pulses; chronic changes of the skin of the bilateral lower extremities.  Skin: Warm and dry, No rashes, No erythema  Neurologic: Alert and oriented x 3, Speech clear/fluent. Normal motor function, Normal sensation, No focal neurologic deficits      EKG interpreted by me: 835.  Sinus rhythm at a rate of 78.  There is a first-degree AV block with a ME of 248.  QTc is 515.  No ST segment elevation concerning for acute MI.    XR chest 1 view   Final Result   Previous heart surgery including valve replacement. Stable left-sided   cardiac pacemaker.        Underlying elevation of the left diaphragm, progressed.        Cardiomegaly with vascular congestion. Left pleural effusion with   associated left-sided atelectasis. Question tiny right pleural   effusion.        Signed by: Darrell May 12/14/2023 9:24 AM   Dictation workstation:   [id number]            ED Course & MDM     This is a 89-year-old male with a history of dementia and hypertension, CKD, heart failure " and reportedly a chronic lobe abnormality for which the patient was recently started on oxygen, perhaps about 2 weeks ago.  Sent to the emergency department with concern for pneumonia.  Patient here does endorse a cough with some phlegm and some mild shortness of breath over the past 3 days.  He does have wheezes and rhonchi bilaterally.  Generation was about 83% on room air here.  The patient is a mouth breather.  We initially tried a nasal cannula and patient had improvement into the high 80s.  We then placed him on a nonrebreather and had improvement into the mid to high 90s.  Sepsis workup was pursued.  Hemodynamic status was reassessed.  Patient was also given breathing treatments.  VBG per chart. Pt place don airvo as this seems to be more acute hypoxic resp failure. 60L 100%  CXR shows fluid.  Pt does have rhonchi/wheezing and endorses cough with sputum production- acutely over the past 3 days. Temp 37.9 here. Concern for infectious etiology.     30 minutes of critical care time was spent with this patient exclusive of billable procedures.    Reviewed the paperwork sent from the facility.  He does have a paperwork that says full code.  Looks like the patient has been receiving prednisone 20 mg twice daily.  Order date was 12.14.    Impression   1. Acute pneumonia         Disposition  Hospitalize       MD Ethan Coleman MD  12/14/23 2266

## 2023-12-15 NOTE — PROGRESS NOTES
12/15/23 7075   Discharge Planning   Living Arrangements Alone   Support Systems Family members   Assistance Needed Patient is from [facility]. Called [facility] and spoke with Daniela she stated that patient is A&OX[facility], from [facility], wheelchair bound and wears 4-5 liters of oxygen at baseline.Patient currently on AIRVO.   Type of Residence Skilled nursing facility   Home or Post Acute Services Post acute facilities (Rehab/SNF/etc)   Type of Post Acute Facility Services Long term care   Patient expects to be discharged to: Patient to return to [facility] once medically ready.   Does the patient need discharge transport arranged? Yes   RoundTrip coordination needed? Yes   Has discharge transport been arranged? No

## 2023-12-15 NOTE — CARE PLAN
The patient's goals for the shift include  improved sp02    The clinical goals for the shift include Pt will not require any increased oxygen during this shift.    Pt started on heparin drip, pt remained on airvo, no c/o pain safety and comfort maintained.

## 2023-12-15 NOTE — CONSULTS
Consults  History Of Present Illness:    Terrell Du is a 89 y.o. 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  Hypoxic in ER-- now on airvo. CXR in ER shows cardiomegaly with vascular congestion and small left pleural effusion. Troponin 46, 49. . BUN/creat 1.75-->48/1.98. VQ study this morning concerning for intermediate to high probability for acute PE.  Plt 76 with h/o thrombocytopenia, BL plt ().  Currently feels comfortable-- denies feeling SOB at rest. Denies chest pain or pressure.  Has LE CVI with taught skin, no pitting edema noted.      Last Recorded Vitals:  Vitals:    12/15/23 0000 12/15/23 0445 12/15/23 0528 12/15/23 0827   BP:   137/63 125/68   BP Location:   Left arm    Patient Position:   Lying    Pulse:   70 59   Resp:   22    Temp:   36.6 °C (97.9 °F) 36.7 °C (98.1 °F)   TempSrc:   Temporal    SpO2: 96% 95% 98% 94%   Weight:       Height:           Last Labs:  CBC - 12/15/2023:  5:55 AM  4.0 11.2 76    38.1      CMP - 12/15/2023:  5:55 AM  8.6 6.1 14 --- 0.6   _ 3.8 7 68      PTT - No results in last year.  _   _ _     Troponin I, High Sensitivity   Date/Time Value Ref Range Status   12/14/2023 10:19 AM 49 (H) 0 - 20 ng/L Final   12/14/2023 08:43 AM 46 (H) 0 - 20 ng/L Final     BNP   Date/Time Value Ref Range Status   12/14/2023 08:43  (H) 0 - 99 pg/mL Final   10/05/2021 03:06  (H) 0 - 99 pg/mL Final     Comment:     .  <100 pg/mL - Heart failure unlikely  100-299 pg/mL - Intermediate probability of acute heart  .               failure exacerbation. Correlate with clinical  .               context and patient history.    >=300 pg/mL - Heart Failure likely.  Correlate with clinical  .               context and patient history.  BNP testing is performed using different testing   methodology at [facility] than at other   system hospitals. Direct result comparisons should   only be made within the same method.     03/24/2021 11:50  (H) 0 - 99 pg/mL Final     Comment:     .  <100 pg/mL - Heart failure unlikely  100-299 pg/mL - Intermediate probability of acute heart  .               failure exacerbation. Correlate with clinical  .               context and patient history.    >=300 pg/mL - Heart Failure likely. Correlate with clinical  .               context and patient history.  BNP testing is performed using different testing   methodology at [facility] than at other   system hospitals. Direct result comparisons should   only be made within the same method.       Hemoglobin A1C   Date/Time Value Ref Range Status   10/06/2021 07:38 AM 6.8 (A) % Final     Comment:          Diagnosis of Diabetes-Adults   Non-Diabetic: < or = 5.6%   Increased risk for developing diabetes: 5.7-6.4%   Diagnostic of diabetes: > or = 6.5%  .       Monitoring of Diabetes                Age (y)     Therapeutic Goal (%)   Adults:          >18           <7.0   Pediatrics:    13-18           <7.5                   7-12           <8.0                   0- 6            7.5-8.5   American Diabetes Association. Diabetes Care 33(S1), Jan 2010.        Last I/O:  I/O last 3 completed shifts:  In: 1490 (21.9 mL/kg) [P.O.:240; IV Piggyback:1250]  Out: 600 (8.8 mL/kg) [Urine:600 (0.2 mL/kg/hr)]  Weight: 68 kg     Past Cardiology Tests (Last 3 Years):  EKG:  ECG V-paced, NSR     Echo:  10/7/2021  CONCLUSIONS:   1. The left ventricular systolic function is normal with a 55-60% estimated ejection fraction.   2. Spectral Doppler shows an impaired relaxation pattern of left ventricular diastolic filling.   3. The left atrium is moderately dilated.   4. The right atrium is  moderately to severely dilated.   5. There is mild mitral valve stenosis.   6. There is moderate mitral annular calcification.   7. Moderate aortic valve stenosis.   8. There is mild tricuspid and pulmonic regurgitation.   9. Moderately elevated pulmonary artery pressure, estimated RVSP 52mmHg    Past Medical History:  He has a past medical history of Essential (primary) hypertension (07/31/2019), Personal history of other diseases of the circulatory system (07/31/2019), Personal history of other diseases of the circulatory system (07/31/2019), Personal history of other diseases of the nervous system and sense organs (07/31/2019), Personal history of other diseases of the respiratory system (07/31/2019), Personal history of other endocrine, nutritional and metabolic disease (07/31/2019), and Personal history of other endocrine, nutritional and metabolic disease (07/31/2019).    Past Surgical History:  He has a past surgical history that includes Other surgical history (07/31/2019); Other surgical history (07/31/2019); Other surgical history (07/31/2019); Other surgical history (07/31/2019); Other surgical history (07/31/2019); Other surgical history (07/31/2019); and Other surgical history (07/31/2019).      Social History:  He reports that he has never smoked. He has never used smokeless tobacco. No history on file for alcohol use and drug use.    Family History:  No family history on file.     Allergies:  Patient has no known allergies.    Inpatient Medications:  Scheduled medications   Medication Dose Route Frequency    amLODIPine  10 mg oral Daily    azithromycin  250 mg oral q24h YOLANDA    furosemide  40 mg intravenous q12h    heparin  80 Units/kg intravenous Once    ipratropium-albuteroL  3 mL nebulization TID    methylPREDNISolone sodium succinate (PF)  125 mg intravenous q24h    pantoprazole  40 mg oral Daily    perflutren lipid microspheres  0.5-10 mL of dilution intravenous Once in imaging    perflutren lipid  microspheres  0.5-10 mL of dilution intravenous Once in imaging    perflutren protein A microsphere  0.5 mL intravenous Once in imaging    perflutren protein A microsphere  0.5 mL intravenous Once in imaging    polyethylene glycol  17 g oral Daily    rOPINIRole  3 mg oral Daily    sulfur hexafluoride microsphr  2 mL intravenous Once in imaging    sulfur hexafluoride microsphr  2 mL intravenous Once in imaging     PRN medications   Medication    acetaminophen    albuterol    guaiFENesin    heparin    oxygen     Continuous Medications   Medication Dose Last Rate    heparin  0-4,500 Units/hr       Outpatient Medications:  Current Outpatient Medications   Medication Instructions    acetaminophen (TYLENOL) 650 mg, oral, Every 4 hours PRN    amLODIPine (Norvasc) 10 mg tablet 1 tablet, oral, Daily    ammonium lactate (Amlactin) 12 % cream 1 Application, Topical, 2 times daily, To the LIZ lower legs    azithromycin (ZITHROMAX) 250 mg, oral, Daily, For 4 days    azithromycin (ZITHROMAX) 500 mg, oral, Once, For 1 day    benzonatate (TESSALON) 100 mg, oral, 3 times daily, Do not crush or chew.    cetirizine (ZyrTEC) 10 mg tablet 1 tablet, oral, Daily    fluticasone (Flonase) 50 mcg/actuation nasal spray 1 spray, Each Nostril, Daily, Shake gently. Before first use, prime pump. After use, clean tip and replace cap.    furosemide (Lasix) 20 mg tablet 1 tablet, oral, Daily    guaiFENesin (MUCINEX) 1,200 mg, oral, 2 times daily    ipratropium-albuteroL (Combivent Respimat)  mcg/actuation inhaler 1 puff, inhalation, 4 times daily RT    ipratropium-albuteroL (Duo-Neb) 0.5-2.5 mg/3 mL nebulizer solution 3 mL, nebulization, Every 4 hours PRN    ipratropium-albuteroL (Duo-Neb) 0.5-2.5 mg/3 mL nebulizer solution 3 mL, nebulization, 3 times daily RT, For 7 days    omega 3-dha-epa-fish oil (Fish OiL) 1,000 mg (120 mg-180 mg) capsule 1 capsule, oral, 2 times daily    pantoprazole (PROTONIX) 40 mg, oral, Daily    [START ON 12/17/2023]  predniSONE (DELTASONE) 20 mg, oral, Daily, For 3 days    predniSONE (DELTASONE) 20 mg, oral, 2 times daily, For 3 days    rOPINIRole (REQUIP) 3 mg, oral, Daily       Physical Exam:  Constitutional: alert x2   Head: Atraumatic, Normocephalic  Eyes: EOMI. RICHARD  Neck: No JVD  Cardiovascular: Regular rate and rhythm, S1, S2. No extra heart sounds or murmurs  Respiratory: expiratory wheezing, rhonchi noted posteriorly   Abdomen: Soft, Nontender, Obese. Bowel sounds appreciated  Musculoskeletal: ROM intact. Muscle strength grossly intact upper and lower extremities 5/5.   Neurological: CNII-XII intact. Sensation grossly intact  Extremities: Warm and dry. No acute rashes and lesions, BLE CVI with non-pitting edema   Psychiatric: Appropriate mood and affect       Assessment/Plan   89 y.o. 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     Assessment:  Acute hypoxic respiratory failure   Intermediate to high probability for acute PE (VQ 12/15/2023)  Pneumonia  Chronic elevated left hemidiaphragm   CKD (BL creat 1.8-2)  Moderate aortic stenosis 2021    Plan:  -Recommend starting heparin drip in the setting of acute PE with plan to transition to DOAC therapy after improved oxygenation requirements.  Currently hemodynamically stable.   -Hold diuresis-- does not appear grossly volume overloaded, however, can consider resuming diuresis pending echocardiogram results.   -Continue amlodipine as taking prior to admit   -Will obtain an echocardiogram for assessment of mechanical and structural function.   -Antibiotics per the primary team.     Peripheral  IV 12/14/23 18 G Right Antecubital (Active)   Site Assessment Clean;Dry 12/15/23 0800   Dressing Status Clean 12/15/23 0800   Number of days: 1       Code Status:  Full Code      JAMAICA Mayorga-CNP    Patient seen, discussed and examined with SUZETTE Correa, above is representative of my medical decision making.    Jer Sullivan MD

## 2023-12-15 NOTE — PROGRESS NOTES
Terrell Du is a 89 y.o. male on day 1 of admission presenting with SOB.      Subjective   Seen and examined, breathing is slightly better, no new complaints.  No overnight events.  The plan discussed with the patient and RN.    Objective     Last Recorded Vitals  /68   Pulse 59   Temp 36.7 °C (98.1 °F)   Resp 22   Wt 68 kg (150 lb)   SpO2 94%   Intake/Output last 3 Shifts:    Intake/Output Summary (Last 24 hours) at 12/15/2023 1121  Last data filed at 12/15/2023 1020  Gross per 24 hour   Intake 480 ml   Output 1200 ml   Net -720 ml       Admission Weight  Weight: 68 kg (150 lb) (12/14/23 0844)    Daily Weight  12/14/23 : 68 kg (150 lb)    Image Results  NM Lung perfusion with spect/ct  Narrative: Interpreted By:  Roberto Arroyo,   STUDY:  NM LUNG PERFUSION WITH SPECT/CT;  12/14/2023 7:12 pm      INDICATION:  Signs/Symptoms:SOB, r/o PE.      COMPARISON:  None.      ACCESSION NUMBER(S):  [id number]      ORDERING CLINICIAN:  ALISHA MARCUS      TECHNIQUE:  DIVISION OF NUCLEAR MEDICINE  PERFUSION LUNG SCAN      Multiple perfusion images of the lungs were obtained after the  intravenous administration of 4.1 mCi of Tc-99m MAA. SPECT CT images  of the lungs were also obtained      FINDINGS:  Analysis of the images reveals significant heterogeneity of tracer  deposition throughout both lungs. Note is made of multiple areas of  diminished perfusion in both lower lung fields. However, findings are  confounded by bilateral pleural effusions/infiltrates.          Impression: Intermediate to high probability of acute pulmonary embolism. Further  evaluation and/or treatment is warranted.              I personally reviewed the images/study and I agree with the findings  as stated.  This study was interpreted at [facility], [place], [place].      MACRO:  Critical Finding:  See findings. Notification was initiated on  12/15/2023 at 6:09 am by  Roberto Arroyo.  (**-OCF-**)  Instructions:          Signed by: Roberto Arroyo 12/15/2023 6:09 AM  Dictation workstation:   [id number]      Physical Exam  Constitutional: alert x2   Head: Atraumatic, Normocephalic  Eyes: EOMI. RICHARD  Neck: No JVD  Cardiovascular: Regular rate and rhythm, S1, S2. No extra heart sounds or murmurs  Respiratory: expiratory wheezing, rhonchi noted posteriorly   Abdomen: Soft, Nontender, Obese. Bowel sounds appreciated  Musculoskeletal: ROM intact. Muscle strength grossly intact upper and lower extremities 5/5.   Neurological: CNII-XII intact. Sensation grossly intact  Extremities: Warm and dry. No acute rashes and lesions, BLE CVI with non-pitting edema   Psychiatric: Appropriate mood and affect     Relevant Results  Results for orders placed or performed during the hospital encounter of 12/14/23 (from the past 24 hour(s))   Procalcitonin   Result Value Ref Range    Procalcitonin 0.31 (H) <=0.07 ng/mL   CBC and Auto Differential   Result Value Ref Range    WBC 4.0 (L) 4.4 - 11.3 x10*3/uL    nRBC 0.0 0.0 - 0.0 /100 WBCs    RBC 4.10 (L) 4.50 - 5.90 x10*6/uL    Hemoglobin 11.2 (L) 13.5 - 17.5 g/dL    Hematocrit 38.1 (L) 41.0 - 52.0 %    MCV 93 80 - 100 fL    MCH 27.3 26.0 - 34.0 pg    MCHC 29.4 (L) 32.0 - 36.0 g/dL    RDW 14.8 (H) 11.5 - 14.5 %    Platelets 76 (L) 150 - 450 x10*3/uL    Neutrophils % 72.9 40.0 - 80.0 %    Immature Granulocytes %, Automated 0.5 0.0 - 0.9 %    Lymphocytes % 24.9 13.0 - 44.0 %    Monocytes % 1.7 2.0 - 10.0 %    Eosinophils % 0.0 0.0 - 6.0 %    Basophils % 0.0 0.0 - 2.0 %    Neutrophils Absolute 2.92 1.60 - 5.50 x10*3/uL    Immature Granulocytes Absolute, Automated 0.02 0.00 - 0.50 x10*3/uL    Lymphocytes Absolute 1.00 0.80 - 3.00 x10*3/uL    Monocytes Absolute 0.07 0.05 - 0.80 x10*3/uL    Eosinophils Absolute 0.00 0.00 - 0.40 x10*3/uL    Basophils Absolute 0.00 0.00 - 0.10 x10*3/uL   Magnesium   Result Value Ref Range    Magnesium 2.21 1.60 - 2.40 mg/dL   Basic metabolic panel   Result Value  Ref Range    Glucose 180 (H) 74 - 99 mg/dL    Sodium 143 136 - 145 mmol/L    Potassium 4.9 3.5 - 5.3 mmol/L    Chloride 106 98 - 107 mmol/L    Bicarbonate 31 21 - 32 mmol/L    Anion Gap 11 10 - 20 mmol/L    Urea Nitrogen 48 (H) 6 - 23 mg/dL    Creatinine 1.96 (H) 0.50 - 1.30 mg/dL    eGFR 31 (L) >60 mL/min/1.73m*2    Calcium 8.6 8.6 - 10.3 mg/dL   D-dimer, VTE Exclusion   Result Value Ref Range    D-Dimer, Quantitative VTE Exclusion 1,466 (H) <=500 ng/mL FEU   Transthoracic Echo (TTE) Complete   Result Value Ref Range    BSA 1.83 m2      Scheduled medications  amLODIPine, 10 mg, oral, Daily  azithromycin, 250 mg, oral, q24h YOLANDA  furosemide, 40 mg, intravenous, q12h  ipratropium-albuteroL, 3 mL, nebulization, TID  pantoprazole, 40 mg, oral, Daily  perflutren lipid microspheres, 0.5-10 mL of dilution, intravenous, Once in imaging  perflutren lipid microspheres, 0.5-10 mL of dilution, intravenous, Once in imaging  perflutren protein A microsphere, 0.5 mL, intravenous, Once in imaging  perflutren protein A microsphere, 0.5 mL, intravenous, Once in imaging  polyethylene glycol, 17 g, oral, Daily  rOPINIRole, 3 mg, oral, Daily  sulfur hexafluoride microsphr, 2 mL, intravenous, Once in imaging  sulfur hexafluoride microsphr, 2 mL, intravenous, Once in imaging      Continuous medications  heparin, 0-4,500 Units/hr, Last Rate: 1,224 Units/hr (12/15/23 1058)      PRN medications  PRN medications: acetaminophen, albuterol, guaiFENesin, heparin, oxygen          Assessment/Plan        Principal Problem:    Acute pneumonia  Active Problems:    Shortness of breath    Terrell Du is a 89 y.o. male presenting with CHF, HTN, GERD, COPD, diffuse OA, RLS, and Hx Skin Melanoma.  He presented to the emergency department with worsening shortness of breath.     #Acute hypoxic respiratory failure   #Intermediate to high probability for acute PE (VQ 12/15/2023)  # COPD exacerbation  #Pneumonia?  #Chronic elevated left hemidiaphragm    #Moderate aortic stenosis 2021  -Still on Airvo on AIRVO  -Labs no leukocytosis, Hgb 11.2, platelets 76  -  -Trop 46 >> 49  -ECG LBBB, no previous ECG to compare  -VQ study this morning concerning for intermediate to high probability for acute PE.       Plan:  -Cardiac diet  -Tele  -ECHO ordered  -Lasix 40 BID IV - on hold as per Cards  -Strict I/Os  -Daily WT   -Maintain neg balance  -Duoneb Q4  -Bronchial hygiene  -on methylprednisone   -ProCal pending  -Pt received abx in the ED  -on Azithromycin  -Cardiology consulted rec: Recommend starting heparin drip in the setting of acute PE with plan to transition to DOAC therapy after improved oxygenation requirements. Hold diuresis   -Pulmonary consulted recommendations are appreciated     # CKD  -Cr 1.7 around baseline  -Nephrology follow-up as oupt     #Thrombocytopenia  -PLTs 75 (baseline 80)  -Monitor  -Heme follow-as out pt     #Anemia  -Workup pending     #Chronic medical problems  -Continue home medications with parameters  -Continue breathing treatments and PPI     #Disposition  -Pending improvement of symptoms and cardiology and pulmonary evaluation.                    Gavino Antonio MD

## 2023-12-16 NOTE — CONSULTS
Inpatient consult to Pulmonology  Consult performed by: Tk Gupta MD  Consult ordered by: Gavino Antonio MD  Reason for consult: acute hypoxic resp failure  Assessment/Recommendations: 89 yo man w/ h/o COPD admitted w/ acute hypoxic resp failure    Acute hypoxic resp failure 2/2 PE - On HVNI 40L/60%.  Wean O2 as tolerated.  Add IPV and incentive spirometry.  Small effusions noted.  Agree with diuresis for now.     Possible COPD exacerbation - change solumedrol to prednisone.  Cont nebs    PE - intermed to high prob for PE.  Will presume this is PE at this time likely due to immobility.  On hep gtt.  Can transition to DOAC.            Reason For Consult  Acute hypoxic resp failure    History Of Present Illness  Terrell Du is a 89 y.o. male presenting with acute hypoxic resp failure.  Pt has h/o melanoma, COPD.  Denies wearing home O2. No fevers, chills.  Has had cough that is different from usual.  Pt w/ worsening dyspnea over the past few days.  Largely immobile.  Perfusion scan w/ SPECt suggestive of intermed to high prob for PE.  Pt requiring airvo.    Pt is a former 5 pack year smoker who quit many years ago.  Worked as a .  Had dgos, cats.  No family history of lung disease.     Past Medical History  Past Medical History:   Diagnosis Date    Essential (primary) hypertension 07/31/2019    Benign essential HTN    Personal history of other diseases of the circulatory system 07/31/2019    History of carotid artery stenosis    Personal history of other diseases of the circulatory system 07/31/2019    History of congestive heart failure    Personal history of other diseases of the nervous system and sense organs 07/31/2019    History of Bell's palsy    Personal history of other diseases of the respiratory system 07/31/2019    History of chronic obstructive lung disease    Personal history of other endocrine, nutritional and metabolic disease 07/31/2019    History of  hyperlipidemia    Personal history of other endocrine, nutritional and metabolic disease 07/31/2019    History of type 2 diabetes mellitus       Surgical History  Past Surgical History:   Procedure Laterality Date    OTHER SURGICAL HISTORY  07/31/2019    Tonsillectomy with adenoidectomy    OTHER SURGICAL HISTORY  07/31/2019    Carotid thromboendarterectomy    OTHER SURGICAL HISTORY  07/31/2019    Shoulder surgery    OTHER SURGICAL HISTORY  07/31/2019    Cataract surgery    OTHER SURGICAL HISTORY  07/31/2019    Pacemaker insertion    OTHER SURGICAL HISTORY  07/31/2019    Aortic valve replacement    OTHER SURGICAL HISTORY  07/31/2019    Hip replacement        Social History  Social History     Tobacco Use    Smoking status: Never    Smokeless tobacco: Never       Family History  No family history on file.     Allergies  Patient has no known allergies.    Review of Systems   Constitutional:  Negative for chills and fever.   Respiratory:  Positive for cough and shortness of breath.    Cardiovascular: Negative.    Gastrointestinal: Negative.    Skin:  Negative for rash.   Neurological: Negative.    Psychiatric/Behavioral: Negative.     All other systems reviewed and are negative.       Physical Exam  Vitals reviewed.   Constitutional:       Appearance: He is ill-appearing.   HENT:      Head: Normocephalic and atraumatic.   Eyes:      Extraocular Movements: Extraocular movements intact.   Cardiovascular:      Rate and Rhythm: Normal rate and regular rhythm.      Heart sounds: Normal heart sounds.   Pulmonary:      Effort: Pulmonary effort is normal.      Comments: Coarse breath sounds bilaterally   Abdominal:      Palpations: Abdomen is soft.      Tenderness: There is no abdominal tenderness.   Musculoskeletal:      Cervical back: Normal range of motion.   Skin:     General: Skin is warm.   Neurological:      General: No focal deficit present.      Mental Status: He is alert and oriented to person, place, and time. Mental  "status is at baseline.   Psychiatric:         Mood and Affect: Mood normal.         Behavior: Behavior normal.          Vital Signs  Blood pressure 112/61, pulse 60, temperature 37.2 °C (99 °F), temperature source Temporal, resp. rate 18, height 1.778 m (5' 10\"), weight 68 kg (150 lb), SpO2 95 %.  Oxygen Therapy  SpO2: 95 %  Medical Gas Therapy: Supplemental oxygen  O2 Delivery Method: High flow nasal cannula  FiO2 (%): 40 %    Relevant Results  XR chest 1 view 12/14/2023    Narrative  Interpreted By:  Darrell May,  STUDY:  XR CHEST 1 VIEW;  12/14/2023 9:09 am    INDICATION:  Signs/Symptoms:cough; fever; hypoxia.    COMPARISON:  Chest x-ray from 10/09/2021. CT scan chest from 10/05/2021.    ACCESSION NUMBER(S):  [id number]    ORDERING CLINICIAN:  GANESH MADRID    TECHNIQUE:  Single AP portable view of the chest was obtained.    FINDINGS:  MEDIASTINUM/ LUNGS/ GUSTAVO:  Underlying elevation of the left diaphragm. This shows progression.  There are pleural and parenchymal opacities in the left lung. Right  lung shows mild vascular congestion and perhaps a tiny right pleural  effusion. No mass or pneumonia in the right lung. Previous heart  surgery including valve replacement. Cardiomegaly. Stable cardiac  pacemaker on the left.    No pneumothorax.  No tracheal deviation.      BONES:  No lytic or blastic destructive bone lesion. There are spinal  fixation rods and pedicle screws in place in the lumbar spine.  Previous reverse right shoulder arthroplasty, grossly intact.    UPPER ABDOMEN:  Grossly intact.    Impression  Previous heart surgery including valve replacement. Stable left-sided  cardiac pacemaker.    Underlying elevation of the left diaphragm, progressed.    Cardiomegaly with vascular congestion. Left pleural effusion with  associated left-sided atelectasis. Question tiny right pleural  effusion.    Signed by: Darrell May 12/14/2023 9:24 AM  Dictation workstation:   [id number]    .Scheduled " medications  amLODIPine, 10 mg, oral, Daily  azithromycin, 250 mg, oral, q24h YOLANDA  furosemide, 40 mg, intravenous, q12h  ipratropium-albuteroL, 3 mL, nebulization, TID  pantoprazole, 40 mg, oral, Daily  perflutren lipid microspheres, 0.5-10 mL of dilution, intravenous, Once in imaging  perflutren lipid microspheres, 0.5-10 mL of dilution, intravenous, Once in imaging  perflutren protein A microsphere, 0.5 mL, intravenous, Once in imaging  perflutren protein A microsphere, 0.5 mL, intravenous, Once in imaging  polyethylene glycol, 17 g, oral, Daily  predniSONE, 40 mg, oral, Daily  rOPINIRole, 3 mg, oral, Daily  sulfur hexafluoride microsphr, 2 mL, intravenous, Once in imaging  sulfur hexafluoride microsphr, 2 mL, intravenous, Once in imaging      Continuous medications  heparin, 0-4,500 Units/hr, Last Rate: 12.2 Units/hr (12/15/23 1606)      PRN medications  PRN medications: acetaminophen, albuterol, guaiFENesin, heparin, oxygen      Results for orders placed or performed during the hospital encounter of 12/14/23 (from the past 24 hour(s))   CBC and Auto Differential   Result Value Ref Range    WBC 4.0 (L) 4.4 - 11.3 x10*3/uL    nRBC 0.0 0.0 - 0.0 /100 WBCs    RBC 4.10 (L) 4.50 - 5.90 x10*6/uL    Hemoglobin 11.2 (L) 13.5 - 17.5 g/dL    Hematocrit 38.1 (L) 41.0 - 52.0 %    MCV 93 80 - 100 fL    MCH 27.3 26.0 - 34.0 pg    MCHC 29.4 (L) 32.0 - 36.0 g/dL    RDW 14.8 (H) 11.5 - 14.5 %    Platelets 76 (L) 150 - 450 x10*3/uL    Neutrophils % 72.9 40.0 - 80.0 %    Immature Granulocytes %, Automated 0.5 0.0 - 0.9 %    Lymphocytes % 24.9 13.0 - 44.0 %    Monocytes % 1.7 2.0 - 10.0 %    Eosinophils % 0.0 0.0 - 6.0 %    Basophils % 0.0 0.0 - 2.0 %    Neutrophils Absolute 2.92 1.60 - 5.50 x10*3/uL    Immature Granulocytes Absolute, Automated 0.02 0.00 - 0.50 x10*3/uL    Lymphocytes Absolute 1.00 0.80 - 3.00 x10*3/uL    Monocytes Absolute 0.07 0.05 - 0.80 x10*3/uL    Eosinophils Absolute 0.00 0.00 - 0.40 x10*3/uL    Basophils  Absolute 0.00 0.00 - 0.10 x10*3/uL   Magnesium   Result Value Ref Range    Magnesium 2.21 1.60 - 2.40 mg/dL   Basic metabolic panel   Result Value Ref Range    Glucose 180 (H) 74 - 99 mg/dL    Sodium 143 136 - 145 mmol/L    Potassium 4.9 3.5 - 5.3 mmol/L    Chloride 106 98 - 107 mmol/L    Bicarbonate 31 21 - 32 mmol/L    Anion Gap 11 10 - 20 mmol/L    Urea Nitrogen 48 (H) 6 - 23 mg/dL    Creatinine 1.96 (H) 0.50 - 1.30 mg/dL    eGFR 31 (L) >60 mL/min/1.73m*2    Calcium 8.6 8.6 - 10.3 mg/dL   D-dimer, VTE Exclusion   Result Value Ref Range    D-Dimer, Quantitative VTE Exclusion 1,466 (H) <=500 ng/mL FEU   Transthoracic Echo (TTE) Complete   Result Value Ref Range    AV pk jason 2.63     AV mn grad 15.9     LVOT diam 1.98     LV biplane EF 54     MV avg E/e' ratio 18.44     MV E/A ratio 1.07     LA vol index A/L 48.1     Tricuspid annular plane systolic excursion 1.9     RV free wall pk S' 9.00     LVIDd 4.46     RVSP 46.6     Aortic Valve Area by Continuity of VTI 1.21     Aortic Valve Area by Continuity of Peak Velocity 1.04     AV pk grad 27.7     LV A4C EF 51.4    Heparin Assay, UFH   Result Value Ref Range    Heparin Unfractionated 0.3 See Comment Below for Therapeutic Ranges IU/mL          Assessment/Plan     89 yo man w/ h/o COPD admitted w/ acute hypoxic resp failure    Acute hypoxic resp failure 2/2 PE - On HVNI 40L/60%.  Wean O2 as tolerated.  Add IPV and incentive spirometry.  Small effusions noted.  Agree with diuresis for now.     Possible COPD exacerbation - change solumedrol to prednisone.  Cont nebs    PE - intermed to high prob for PE.  Will presume this is PE at this time likely due to immobility.  On hep gtt.  Can transition to DOAC.        Tk Gupta MD

## 2023-12-16 NOTE — PROGRESS NOTES
Subjective Data:  Remains on airvo this morning, however, SOP2 99%-- feels improved today at rest.     Objective Data:  Last Recorded Vitals:  Vitals:    12/16/23 0400 12/16/23 0604 12/16/23 1035 12/16/23 1047   BP:  148/58  136/57   BP Location:  Left arm  Right arm   Patient Position:  Lying  Sitting   Pulse:  60  60   Resp:  18  16   Temp:  37.3 °C (99.1 °F)  36.3 °C (97.3 °F)   TempSrc:  Temporal  Temporal   SpO2: 95% 94% 98% 99%   Weight:  68.1 kg (150 lb 2.1 oz)     Height:           Last Labs:  CBC - 12/15/2023:  8:17 PM  6.5 10.5 83    35.0      CMP - 12/16/2023:  6:10 AM  8.6 6.1 14 --- 0.6   _ 3.8 7 68      PTT - No results in last year.  _   _ _     TROPHS   Date/Time Value Ref Range Status   12/14/2023 10:19 AM 49 0 - 20 ng/L Final   12/14/2023 08:43 AM 46 0 - 20 ng/L Final     BNP   Date/Time Value Ref Range Status   12/14/2023 08:43  0 - 99 pg/mL Final   10/05/2021 03:06  0 - 99 pg/mL Final     Comment:     .  <100 pg/mL - Heart failure unlikely  100-299 pg/mL - Intermediate probability of acute heart  .               failure exacerbation. Correlate with clinical  .               context and patient history.    >=300 pg/mL - Heart Failure likely. Correlate with clinical  .               context and patient history.  BNP testing is performed using different testing   methodology at [facility]) than at other   [facility]. Direct result comparisons should   only be made within the same method.     03/24/2021 11:50  0 - 99 pg/mL Final     Comment:     .  <100 pg/mL - Heart failure unlikely  100-299 pg/mL - Intermediate probability of acute heart  .               failure exacerbation. Correlate with clinical  .               context and patient history.    >=300 pg/mL - Heart Failure likely. Correlate with clinical  .               context and patient history.  BNP testing is performed using different testing   methodology at [facility] than at other    system hospitals. Direct result comparisons should   only be made within the same method.       HGBA1C   Date/Time Value Ref Range Status   10/06/2021 07:38 AM 6.8 % Final     Comment:          Diagnosis of Diabetes-Adults   Non-Diabetic: < or = 5.6%   Increased risk for developing diabetes: 5.7-6.4%   Diagnostic of diabetes: > or = 6.5%  .       Monitoring of Diabetes                Age (y)     Therapeutic Goal (%)   Adults:          >18           <7.0   Pediatrics:    13-18           <7.5                   7-12           <8.0                   0- 6            7.5-8.5   American Diabetes Association. Diabetes Care 33(S1), Jan 2010.        Last I/O:  I/O last 3 completed shifts:  In: 404.6 (5.9 mL/kg) [P.O.:240; I.V.:164.6 (2.4 mL/kg)]  Out: 2550 (37.4 mL/kg) [Urine:2550 (1 mL/kg/hr)]  Weight: 68.1 kg     Past Cardiology Tests (Last 3 Years):  Echo:  Transthoracic Echo (TTE) Complete 12/15/2023  CONCLUSIONS:   1. Left ventricular systolic function is low normal with a 50-55% estimated ejection fraction.   2. Abnormal septal motion consistent with RV pacemaker.   3. Spectral Doppler shows a pseudonormal pattern of left ventricular diastolic filling.   4. The left atrium is moderately dilated.   5. There is moderate mitral annular calcification.   6. Mild to moderate tricuspid regurgitation visualized.   7. Moderate aortic valve stenosis.   8. The estimated pulmonary artery pressure is mildly elevated with the RVSP at 46.6 mmHg.     Inpatient Medications:  Scheduled medications   Medication Dose Route Frequency    amLODIPine  10 mg oral Daily    ammonium lactate   Topical BID    azithromycin  250 mg oral q24h YOLANDA    docusate sodium  100 mg oral BID    [START ON 12/17/2023] furosemide  40 mg intravenous Daily    ipratropium-albuteroL  3 mL nebulization TID    pantoprazole  40 mg oral Daily    perflutren lipid microspheres  0.5-10 mL of dilution intravenous Once in imaging    perflutren lipid microspheres  0.5-10 mL  of dilution intravenous Once in imaging    perflutren protein A microsphere  0.5 mL intravenous Once in imaging    perflutren protein A microsphere  0.5 mL intravenous Once in imaging    polyethylene glycol  17 g oral Daily    predniSONE  40 mg oral Daily    rOPINIRole  3 mg oral Daily    sulfur hexafluoride microsphr  2 mL intravenous Once in imaging    sulfur hexafluoride microsphr  2 mL intravenous Once in imaging     PRN medications   Medication    acetaminophen    albuterol    guaiFENesin    heparin    oxygen     Continuous Medications   Medication Dose Last Rate    heparin  0-4,500 Units/hr 1,320 Units/hr (12/16/23 0518)       Physical Exam:  Constitutional: Pleasant, Awake/Alert/Oriented to person place and time.  Head: Atraumatic, Normocephalic  Eyes: EOMI. RICHARD  Neck: No JVD  Cardiovascular: Regular rate and rhythm, S1, S2. No extra heart sounds or murmurs  Respiratory: rhonchi noted at bases posteriorly   Abdomen: Soft, Nontender, Obese. Bowel sounds appreciated  Musculoskeletal: ROM intact. Muscle strength grossly intact upper and lower extremities 5/5.   Neurological: CNII-XII intact. Sensation grossly intact  Extremities: Warm and dry. BLE CVI with taught skin, LLE with serous drainage posteriorly   Psychiatric: Appropriate mood and affect       Assessment/Plan   89 y.o. 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      Assessment:  Acute hypoxic respiratory failure   Intermediate to high probability for acute PE (VQ 12/15/2023), no RV strain on echo   Pneumonia  Chronic elevated left hemidiaphragm   CKD (BL creat  1.8-2)  Moderate aortic stenosis 2021    Plan:  - Continue heparin with plan to transition to DOAC after improvement in O2 requirement  - Likely transition to PO lasix tomorrow 40mg daily  - Can continue amlodipine 10mg daily   - Wean airvo as tolerated for SPO2 >92%   - Antibiotics per the primary team      Peripheral IV 12/14/23 18 G Right Antecubital (Active)   Site Assessment Clean;Dry;Intact 12/16/23 0800   Dressing Status Clean;Dry 12/16/23 0800   Number of days: 2       Code Status:  Full Code    JAMAICA Mayorga-CNP

## 2023-12-16 NOTE — PROGRESS NOTES
"Terrell Du is a 89 y.o. male on day 2 of admission presenting with Acute pneumonia.    Subjective   Feels dyspnea is improved today.       Objective     Physical Exam  Vitals reviewed.   Constitutional:       Appearance: He is ill-appearing.   HENT:      Head: Normocephalic and atraumatic.   Eyes:      Extraocular Movements: Extraocular movements intact.   Cardiovascular:      Rate and Rhythm: Normal rate and regular rhythm.      Heart sounds: Normal heart sounds.   Pulmonary:      Effort: Pulmonary effort is normal.      Breath sounds: Decreased breath sounds present.   Abdominal:      Palpations: Abdomen is soft.      Tenderness: There is no abdominal tenderness.   Musculoskeletal:      Cervical back: Normal range of motion.   Skin:     General: Skin is warm.   Neurological:      General: No focal deficit present.      Mental Status: He is alert and oriented to person, place, and time. Mental status is at baseline.   Psychiatric:         Mood and Affect: Mood normal.         Behavior: Behavior normal.         Last Recorded Vitals  Blood pressure 136/57, pulse 60, temperature 36.3 °C (97.3 °F), temperature source Temporal, resp. rate 16, height 1.778 m (5' 10\"), weight 68.1 kg (150 lb 2.1 oz), SpO2 97 %.  Intake/Output last 3 Shifts:  I/O last 3 completed shifts:  In: 404.6 (5.9 mL/kg) [P.O.:240; I.V.:164.6 (2.4 mL/kg)]  Out: 2550 (37.4 mL/kg) [Urine:2550 (1 mL/kg/hr)]  Weight: 68.1 kg     Relevant Results                Scheduled medications  amLODIPine, 10 mg, oral, Daily  ammonium lactate, , Topical, BID  azithromycin, 250 mg, oral, q24h YOLANDA  docusate sodium, 100 mg, oral, BID  [START ON 12/17/2023] furosemide, 40 mg, intravenous, Daily  ipratropium-albuteroL, 3 mL, nebulization, TID  pantoprazole, 40 mg, oral, Daily  perflutren lipid microspheres, 0.5-10 mL of dilution, intravenous, Once in imaging  perflutren lipid microspheres, 0.5-10 mL of dilution, intravenous, Once in imaging  perflutren protein " A microsphere, 0.5 mL, intravenous, Once in imaging  perflutren protein A microsphere, 0.5 mL, intravenous, Once in imaging  polyethylene glycol, 17 g, oral, Daily  predniSONE, 40 mg, oral, Daily  rOPINIRole, 3 mg, oral, Daily  sulfur hexafluoride microsphr, 2 mL, intravenous, Once in imaging  sulfur hexafluoride microsphr, 2 mL, intravenous, Once in imaging      Continuous medications  heparin, 0-4,500 Units/hr, Last Rate: 1,320 Units/hr (12/16/23 1257)      PRN medications  PRN medications: acetaminophen, albuterol, guaiFENesin, heparin, oxygen    Results for orders placed or performed during the hospital encounter of 12/14/23 (from the past 24 hour(s))   Heparin Assay, UFH   Result Value Ref Range    Heparin Unfractionated 0.3 See Comment Below for Therapeutic Ranges IU/mL   CBC and Auto Differential   Result Value Ref Range    WBC 6.5 4.4 - 11.3 x10*3/uL    nRBC 0.0 0.0 - 0.0 /100 WBCs    RBC 3.79 (L) 4.50 - 5.90 x10*6/uL    Hemoglobin 10.5 (L) 13.5 - 17.5 g/dL    Hematocrit 35.0 (L) 41.0 - 52.0 %    MCV 92 80 - 100 fL    MCH 27.7 26.0 - 34.0 pg    MCHC 30.0 (L) 32.0 - 36.0 g/dL    RDW 14.6 (H) 11.5 - 14.5 %    Platelets 83 (L) 150 - 450 x10*3/uL    Neutrophils % 62.0 40.0 - 80.0 %    Immature Granulocytes %, Automated 0.3 0.0 - 0.9 %    Lymphocytes % 28.4 13.0 - 44.0 %    Monocytes % 9.3 2.0 - 10.0 %    Eosinophils % 0.0 0.0 - 6.0 %    Basophils % 0.0 0.0 - 2.0 %    Neutrophils Absolute 4.01 1.60 - 5.50 x10*3/uL    Immature Granulocytes Absolute, Automated 0.02 0.00 - 0.50 x10*3/uL    Lymphocytes Absolute 1.84 0.80 - 3.00 x10*3/uL    Monocytes Absolute 0.60 0.05 - 0.80 x10*3/uL    Eosinophils Absolute 0.00 0.00 - 0.40 x10*3/uL    Basophils Absolute 0.00 0.00 - 0.10 x10*3/uL   Heparin Assay, UFH   Result Value Ref Range    Heparin Unfractionated 0.2 See Comment Below for Therapeutic Ranges IU/mL   Heparin Assay, UFH   Result Value Ref Range    Heparin Unfractionated 0.4 See Comment Below for Therapeutic Ranges  IU/mL   Magnesium   Result Value Ref Range    Magnesium 2.30 1.60 - 2.40 mg/dL   Basic metabolic panel   Result Value Ref Range    Glucose 181 (H) 74 - 99 mg/dL    Sodium 142 136 - 145 mmol/L    Potassium 4.8 3.5 - 5.3 mmol/L    Chloride 103 98 - 107 mmol/L    Bicarbonate 34 (H) 21 - 32 mmol/L    Anion Gap 10 10 - 20 mmol/L    Urea Nitrogen 52 (H) 6 - 23 mg/dL    Creatinine 1.94 (H) 0.50 - 1.30 mg/dL    eGFR 31 (L) >60 mL/min/1.73m*2    Calcium 8.6 8.6 - 10.3 mg/dL   Heparin Assay, UFH   Result Value Ref Range    Heparin Unfractionated 0.3 See Comment Below for Therapeutic Ranges IU/mL   Lavender Top   Result Value Ref Range    Extra Tube Hold for add-ons.                   Assessment/Plan   Principal Problem:    Acute pneumonia  Active Problems:    Shortness of breath     89 yo man w/ h/o COPD admitted w/ acute hypoxic resp failure     Acute hypoxic resp failure 2/2 PE - On 8L.  Wean O2 as tolerated.  Cont  IPV and incentive spirometry.  Small effusions noted.  Agree with diuresis for now.      Possible COPD exacerbation - contprednisone.  Cont nebs     PE - intermed to high prob for PE.  Will presume this is PE at this time likely due to immobility and would need indefinite anticoagulation if can tolerate.  On hep gtt.  Can transition to DOAC.         Tk Gupta MD

## 2023-12-16 NOTE — PROGRESS NOTES
Ondina Jacobo is a 89 y.o. male on day 2 of admission presenting with Acute pneumonia.      Subjective   Says his breathing is ok. No chest pain. Ate a good breakfast     Objective     Last Recorded Vitals  /58 (BP Location: Left arm, Patient Position: Lying)   Pulse 60   Temp 37.3 °C (99.1 °F) (Temporal)   Resp 18   Wt 68.1 kg (150 lb 2.1 oz)   SpO2 94%   Intake/Output last 3 Shifts:    Intake/Output Summary (Last 24 hours) at 12/16/2023 1010  Last data filed at 12/16/2023 0604  Gross per 24 hour   Intake 404.6 ml   Output 1950 ml   Net -1545.4 ml       Admission Weight  Weight: 68 kg (150 lb) (12/14/23 0844)    Daily Weight  12/16/23 : 68.1 kg (150 lb 2.1 oz)    Image Results  Vascular US Lower Extremity Venous Duplex Bilateral            [place] [address]   Tel [phone] and Fax [phone]       Vascular Lab Report  [facility] US LOWER EXTREMITY VENOUS DUPLEX BILATERAL       Patient Name:      ONDINA ROBLES            Mau Physician: [id number] Carla JACOBO MD  Study Date:        12/15/2023          Ordering           [id number] LATIA CORREA                                         Physician:  MRN/PID:           [id number]            Technologist:      Maria Teresa JACKSON  Accession#:        [id number]        Technologist 2:  Date of Birth/Age: 3/6/1928 / 89 years Encounter#:        [id number]  Gender:            M  Admission Status:  Outpatient          Location           [facility]                                         Performed:       Diagnosis/ICD: Shortness of breath-R06.02  CPT Codes:     15433 Peripheral venous duplex scan for DVT complete       **CRITICAL RESULT**  Critical Result: possible near occlusion/occlusion right distal femoral and popliteal artery  Notification called to Latia Correa CNP on 12/15/2023 at 2:13:08 PM by RENETTA Apple.     CONCLUSIONS:  Right Lower Venous:  No evidence of acute deep vein thrombus visualized in the right lower extremity. Cannot rule out thrombus in non-visualized distal femoral vein. Incidental finding plaque at right distal femoral artery and popliteal artery. Possible near occlusion. Monophasic flow is noted at right distal femoral, popliteal, post tibial, and peroneal artery.  Left Lower Venous: No evidence of acute deep vein thrombus visualized in the left lower extremity. Note made unable to fully compress left common femoral vein due to patient position and inability to tolerate compression. Cannot rule out thrombus in left common femoral vein.     Additional Findings:  Technically difficult exam is noted due to patient inability to cooperate and  tolerate compressions. Patient is noted to be on Air-Vo.       Imaging & Doppler Findings:     Right                 Compressible Thrombus        Flow  Distal External Iliac     Yes        None   Spontaneous/Phasic  CFV                       Yes        None   Spontaneous/Phasic  PFV                       Yes        None  FV Proximal               Yes        None   Spontaneous/Phasic  FV Mid                    Yes        None  Popliteal                 Yes        None   Spontaneous/Phasic  Peroneal                  Yes        None  PTV                       Yes        None       Left                  Compress Thrombus        Flow  Distal External Iliac   Yes      None   Spontaneous/Phasic  CFV                                     Spontaneous/Phasic  PFV                     Yes      None  FV Proximal             Yes      None   Spontaneous/Phasic  FV Mid                  Yes      None  FV Distal               Yes      None  Popliteal               Yes      None   Spontaneous/Phasic  Peroneal                Yes      None  PTV                     Yes      None       [id number] Carla Bain MD  Electronically signed by [id number] Carla Bain MD on 12/15/2023 at 2:22:30 PM       ** Final **  Transthoracic Echo (TTE)  Complete     [facility], [address] [place] [address]                Tel [phone] and Fax [phone]    TRANSTHORACIC ECHOCARDIOGRAM REPORT       Patient Name:      ONDINA ROBLES [place] Physician:    [id number] Jer Sullivan MD  Study Date:        12/15/2023           Ordering Provider:    [id number] ALISHA MARCUS  MRN/PID:           [id number]             Fellow:  Accession#:        [id number]         Nurse:  Date of Birth/Age: 3/6/1928 / 89 years  Sonographer:          Mary Gallegos                                                                RD  Gender:            M                    Additional Staff:  Height:            177.80 cm            Admit Date:           12/14/2023  Weight:            68.04 kg             Admission Status:     Inpatient -                                                                Routine  BSA:               1.85 m2              Encounter#:           [id number]                                          Department Location:  [address]  Blood Pressure: 137 /63 mmHg    Study Type:    TRANSTHORACIC ECHO (TTE) COMPLETE  Diagnosis/ICD: 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 elevated troponin, elevated BNP,                     echo 10/7/21 mod AS.    Study Detail: The following Echo studies were performed: 2D, M-Mode, Doppler and                color flow.       PHYSICIAN INTERPRETATION:  Left Ventricle: The left ventricular systolic function is low normal, with an estimated ejection fraction of 50-55%. There are no regional wall motion abnormalities. The left ventricular cavity size is normal.  Abnormal (paradoxical) septal motion, consistent with RV pacemaker. Spectral Doppler shows a pseudonormal pattern of left ventricular diastolic filling.  Left Atrium: The left atrium is moderately dilated.  Right Ventricle: The right ventricle is normal in size. There is normal right ventricular global systolic function. A device is visualized in the right ventricle.  Right Atrium: The right atrium is mildly dilated.  Aortic Valve: The aortic valve is trileaflet. There is evidence of moderate aortic valve stenosis.  There is no evidence of aortic valve regurgitation. The peak instantaneous gradient of the aortic valve is 27.7 mmHg. The mean gradient of the aortic valve is 15.9 mmHg.  Mitral Valve: The mitral valve is normal in structure. There is moderate mitral annular calcification. There is mild mitral valve regurgitation.  Tricuspid Valve: The tricuspid valve is structurally normal. There is mild to moderate tricuspid regurgitation.  Pulmonic Valve: The pulmonic valve is structurally normal. There is trace pulmonic valve regurgitation.  Pericardium: There is no pericardial effusion noted.  Aorta: The aortic root is normal.  Pulmonary Artery: The tricuspid regurgitant velocity is 3.03 m/s, and with an estimated right atrial pressure of 10 mmHg, the estimated pulmonary artery pressure is mildly elevated with the RVSP at 46.6 mmHg.  Systemic Veins: The inferior vena cava appears to be of normal size.       CONCLUSIONS:   1. Left ventricular systolic function is low normal with a 50-55% estimated ejection fraction.   2. Abnormal septal motion consistent with RV pacemaker.   3. Spectral Doppler shows a pseudonormal pattern of left ventricular diastolic filling.   4. The left atrium is moderately dilated.   5. There is moderate mitral annular calcification.   6. Mild to moderate tricuspid regurgitation visualized.   7. Moderate aortic valve stenosis.   8. The estimated pulmonary artery pressure is mildly elevated with  the RVSP at 46.6 mmHg.    QUANTITATIVE DATA SUMMARY:  2D MEASUREMENTS:                            Normal Ranges:  IVSd:          1.31 cm    (0.6-1.1cm)  LVPWd:         1.03 cm    (0.6-1.1cm)  LVIDd:         4.46 cm    (3.9-5.9cm)  LVIDs:         3.33 cm  LV Mass Index: 101.8 g/m2  LV % FS        25.4 %    LA VOLUME:                               Normal Ranges:  LA Vol A4C:       78.4 ml    (22+/-6mL/m2)  LA Vol A2C:       88.6 ml  LA Vol BP:        88.9 ml  LA Vol Index A4C: 42.5 ml/m2  LA Vol Index A2C: 48.0 ml/m2  LA Vol Index BP:  48.1 ml/m2  LA Volume Index:  48.0 ml/m2  LA Vol A4C:       72.8 ml  LA Vol A2C:       84.2 ml    RA VOLUME BY A/L METHOD:                        Normal Ranges:  RA Area A4C: 22.6 cm2    M-MODE MEASUREMENTS:                   Normal Ranges:  Ao Root: 3.60 cm (2.0-3.7cm)  LAs:     3.48 cm (2.7-4.0cm)    LV SYSTOLIC FUNCTION BY 2D PLANIMETRY (MOD):                      Normal Ranges:  EF-A4C View: 51.4 % (>=55%)  EF-A2C View: 51.7 %  EF-Biplane:  53.8 %    LV DIASTOLIC FUNCTION:                            Normal Ranges:  MV Peak E:    1.29 m/s    (0.7-1.2 m/s)  MV Peak A:    1.21 m/s    (0.42-0.7 m/s)  E/A Ratio:    1.07        (1.0-2.2)  MV e'         0.07 m/s    (>8.0)  MV lateral e' 0.07 m/s  MV A Dur:     136.10 msec  E/e' Ratio:   18.44       (<8.0)    MITRAL VALVE:                  Normal Ranges:  MV DT: 280 msec (150-240msec)    AORTIC VALVE:                                     Normal Ranges:  AoV Vmax:                2.63 m/s  (<=1.7m/s)  AoV Peak P.7 mmHg (<20mmHg)  AoV Mean PG:             15.9 mmHg (1.7-11.5mmHg)  LVOT Max Khadar:            0.89 m/s  (<=1.1m/s)  AoV VTI:                 61.94 cm  (18-25cm)  LVOT VTI:                24.39 cm  LVOT Diameter:           1.98 cm   (1.8-2.4cm)  AoV Area, VTI:           1.21 cm2  (2.5-5.5cm2)  AoV Area,Vmax:           1.04 cm2  (2.5-4.5cm2)  AoV Dimensionless Index: 0.39       RIGHT VENTRICLE:  TAPSE: 19.0 mm  RV s'   0.09 m/s    TRICUSPID VALVE/RVSP:                              Normal Ranges:  Peak TR Velocity: 3.03 m/s  RV Syst Pressure: 46.6 mmHg (< 30mmHg)    PULMONIC VALVE:                       Normal Ranges:  PV Max Khadar: 1.1 m/s  (0.6-0.9m/s)  PV Max P.6 mmHg       [id number] Jer Sullivan MD  Electronically signed on 12/15/2023 at 12:19:17 PM       ** Final **  NM Lung perfusion with spect/ct  Narrative: Interpreted By:  Roberto Arroyo,   STUDY:  NM LUNG PERFUSION WITH SPECT/CT;  2023 7:12 pm      INDICATION:  Signs/Symptoms:SOB, r/o PE.      COMPARISON:  None.      ACCESSION NUMBER(S):  [id number]      ORDERING CLINICIAN:  ALISHA ANTONIO      TECHNIQUE:  DIVISION OF NUCLEAR MEDICINE  PERFUSION LUNG SCAN      Multiple perfusion images of the lungs were obtained after the  intravenous administration of 4.1 mCi of Tc-99m MAA. SPECT CT images  of the lungs were also obtained      FINDINGS:  Analysis of the images reveals significant heterogeneity of tracer  deposition throughout both lungs. Note is made of multiple areas of  diminished perfusion in both lower lung fields. However, findings are  confounded by bilateral pleural effusions/infiltrates.          Impression: Intermediate to high probability of acute pulmonary embolism. Further  evaluation and/or treatment is warranted.              I personally reviewed the images/study and I agree with the findings  as stated.  This study was interpreted at [facility], [place], [place].      MACRO:  Critical Finding:  See findings. Notification was initiated on  12/15/2023 at 6:09 am by  Roberto Arroyo.  (**-OCF-**) Instructions:          Signed by: Roberto Arroyo 12/15/2023 6:09 AM  Dictation workstation:   [id number]    Current Facility-Administered Medications:     acetaminophen (Tylenol) tablet 650 mg, 650 mg, oral, q4h PRN, Alisha Antonio MD    albuterol 2.5 mg /3 mL (0.083 %) nebulizer solution 2.5 mg, 2.5 mg,  nebulization, q2h PRN, Gavino Antonio MD    amLODIPine (Norvasc) tablet 10 mg, 10 mg, oral, Daily, Gavino Antonio MD, 10 mg at 12/16/23 0936    azithromycin (Zithromax) tablet 250 mg, 250 mg, oral, q24h YOLANDA, Gavino Antonio MD, 250 mg at 12/16/23 0936    furosemide (Lasix) injection 40 mg, 40 mg, intravenous, q12h, Gavino Antonio MD, 40 mg at 12/16/23 0502    guaiFENesin (Mucinex) 12 hr tablet 600 mg, 600 mg, oral, BID PRN, Gavino Antonio MD    heparin (porcine) injection 2,000-4,000 Units, 2,000-4,000 Units, intravenous, q4h PRN, HALI Mayorga    heparin 25,000 Units in dextrose 5% 250 mL (100 Units/mL) infusion (premix), 0-4,500 Units/hr, intravenous, Continuous, HALI Mayorga, Last Rate: 13.2 mL/hr at 12/16/23 0518, 1,320 Units/hr at 12/16/23 0518    ipratropium-albuteroL (Duo-Neb) 0.5-2.5 mg/3 mL nebulizer solution 3 mL, 3 mL, nebulization, TID, Gavino Antonio MD, 3 mL at 12/15/23 2120    oxygen (O2) therapy, , inhalation, Continuous PRN - O2/gases, Gavino Antonio MD    pantoprazole (ProtoNix) EC tablet 40 mg, 40 mg, oral, Daily, Gavino Antonio MD, 40 mg at 12/16/23 0936    perflutren lipid microspheres (Definity) injection 0.5-10 mL of dilution, 0.5-10 mL of dilution, intravenous, Once in imaging, Gavino Antonio MD    perflutren lipid microspheres (Definity) injection 0.5-10 mL of dilution, 0.5-10 mL of dilution, intravenous, Once in imaging, Gavino Antonio MD    perflutren protein A microsphere (Optison) injection 0.5 mL, 0.5 mL, intravenous, Once in imaging, Gavino Antonio MD    perflutren protein A microsphere (Optison) injection 0.5 mL, 0.5 mL, intravenous, Once in imaging, Gavino Antonio MD    polyethylene glycol (Glycolax, Miralax) packet 17 g, 17 g, oral, Daily, Gavino Antonio MD, 17 g at 12/16/23 0937    predniSONE (Deltasone) tablet 40 mg, 40 mg, oral,  Daily, Tk Gupta MD, 40 mg at 12/16/23 0936    rOPINIRole (Requip) tablet 3 mg, 3 mg, oral, Daily, Gavino Antonio MD, 3 mg at 12/16/23 0936    sulfur hexafluoride microsphr (Lumason) injection 24.28 mg, 2 mL, intravenous, Once in imaging, Gavino Antonio MD    sulfur hexafluoride microsphr (Lumason) injection 24.28 mg, 2 mL, intravenous, Once in imaging, Gavino Antonio MD   Results for orders placed or performed during the hospital encounter of 12/14/23 (from the past 24 hour(s))   Transthoracic Echo (TTE) Complete   Result Value Ref Range    AV pk jason 2.63     AV mn grad 15.9     LVOT diam 1.98     LV biplane EF 54     MV avg E/e' ratio 18.44     MV E/A ratio 1.07     LA vol index A/L 48.1     Tricuspid annular plane systolic excursion 1.9     RV free wall pk S' 9.00     LVIDd 4.46     RVSP 46.6     Aortic Valve Area by Continuity of VTI 1.21     Aortic Valve Area by Continuity of Peak Velocity 1.04     AV pk grad 27.7     LV A4C EF 51.4    Heparin Assay, UFH   Result Value Ref Range    Heparin Unfractionated 0.3 See Comment Below for Therapeutic Ranges IU/mL   CBC and Auto Differential   Result Value Ref Range    WBC 6.5 4.4 - 11.3 x10*3/uL    nRBC 0.0 0.0 - 0.0 /100 WBCs    RBC 3.79 (L) 4.50 - 5.90 x10*6/uL    Hemoglobin 10.5 (L) 13.5 - 17.5 g/dL    Hematocrit 35.0 (L) 41.0 - 52.0 %    MCV 92 80 - 100 fL    MCH 27.7 26.0 - 34.0 pg    MCHC 30.0 (L) 32.0 - 36.0 g/dL    RDW 14.6 (H) 11.5 - 14.5 %    Platelets 83 (L) 150 - 450 x10*3/uL    Neutrophils % 62.0 40.0 - 80.0 %    Immature Granulocytes %, Automated 0.3 0.0 - 0.9 %    Lymphocytes % 28.4 13.0 - 44.0 %    Monocytes % 9.3 2.0 - 10.0 %    Eosinophils % 0.0 0.0 - 6.0 %    Basophils % 0.0 0.0 - 2.0 %    Neutrophils Absolute 4.01 1.60 - 5.50 x10*3/uL    Immature Granulocytes Absolute, Automated 0.02 0.00 - 0.50 x10*3/uL    Lymphocytes Absolute 1.84 0.80 - 3.00 x10*3/uL    Monocytes Absolute 0.60 0.05 - 0.80 x10*3/uL    Eosinophils  Absolute 0.00 0.00 - 0.40 x10*3/uL    Basophils Absolute 0.00 0.00 - 0.10 x10*3/uL   Heparin Assay, UFH   Result Value Ref Range    Heparin Unfractionated 0.2 See Comment Below for Therapeutic Ranges IU/mL   Heparin Assay, UFH   Result Value Ref Range    Heparin Unfractionated 0.4 See Comment Below for Therapeutic Ranges IU/mL   Magnesium   Result Value Ref Range    Magnesium 2.30 1.60 - 2.40 mg/dL   Basic metabolic panel   Result Value Ref Range    Glucose 181 (H) 74 - 99 mg/dL    Sodium 142 136 - 145 mmol/L    Potassium 4.8 3.5 - 5.3 mmol/L    Chloride 103 98 - 107 mmol/L    Bicarbonate 34 (H) 21 - 32 mmol/L    Anion Gap 10 10 - 20 mmol/L    Urea Nitrogen 52 (H) 6 - 23 mg/dL    Creatinine 1.94 (H) 0.50 - 1.30 mg/dL    eGFR 31 (L) >60 mL/min/1.73m*2    Calcium 8.6 8.6 - 10.3 mg/dL   Heparin Assay, UFH   Result Value Ref Range    Heparin Unfractionated 0.3 See Comment Below for Therapeutic Ranges IU/mL   Lavender Top   Result Value Ref Range    Extra Tube Hold for add-ons.       Physical Exam  Constitutional:       Appearance: Normal appearance.   HENT:      Head: Normocephalic and atraumatic.      Ears:      Comments: Mildly hard of hearing     Nose: Nose normal.      Mouth/Throat:      Mouth: Mucous membranes are dry.   Eyes:      Extraocular Movements: Extraocular movements intact.      Pupils: Pupils are equal, round, and reactive to light.   Neck:      Comments: No jvd  Cardiovascular:      Comments: Regular, 2-3/6 blowing systolic murmur. S2 is present  Pulmonary:      Comments: Normal effort. Fair excursion. Few scattered expiratory wheezes. Equal air exchange, prolonged expiratory phase. Decreased breath sounds in bases  Abdominal:      General: Bowel sounds are normal.      Palpations: Abdomen is soft.   Musculoskeletal:      Comments: Very faint distal pulses.   No edema.   Severe stasis dermatitis, scaling noted as well.    Skin:     General: Skin is warm and dry.   Neurological:      Mental Status: He is  alert.      Comments: Alert, able to answer questions, follow commands.   Leg strength is 4-/5 bilaterally  Cranial nerves appear intact   Psychiatric:         Mood and Affect: Mood normal.         Behavior: Behavior normal.         Relevant Results             Assessment/Plan                  Principal Problem:    Acute pneumonia  Active Problems:    Shortness of breath    Acute PE/hypoxic respiratory failure--he is on heparin. He remains on airvo, currently 30/50. Wean as able. Appreciate pulm input. No evidence of right heart strain on echo. Suggestion of dvt in legs. Plan is for transition to DOAC, but would like to wait until oxygenation improves.   Mild chf/effusions. He is negative 1500 ml. Monitor  COPD, nebs. Pulm hygiene  CKD, stable, monitor. Avoid nephrotoxic agents.   Hx of malignant melanoma  Hypertension. Stable, continue to monitor  Moderate aortic stenosis. Normal EF on echo. Also with mild TR.   Troponin leak, secondary to demand, hypoxia  etc.               Neda Leigh MD

## 2023-12-16 NOTE — CARE PLAN
Problem: Safety  Goal: Patient will be injury free during hospitalization  Outcome: Progressing  Goal: I will remain free of falls  Outcome: Progressing     Problem: Fall/Injury  Goal: Not fall by end of shift  Outcome: Progressing     Problem: Daily Care  Goal: Daily care needs are met  Outcome: Progressing   The patient's goals for the shift include      The clinical goals for the shift include pt will have improved spo02

## 2023-12-17 NOTE — PROGRESS NOTES
Ondina Jacobo is a 89 y.o. male on day 3 of admission presenting with Acute pneumonia.      Subjective   Says he is feeling '' this morning. Admits he was having some difficulty breathing earlier, but is better now. No increased cough, chest pain, heaviness etc. Ate all his breakfast.       Objective     Last Recorded Vitals  /78 (BP Location: Left arm, Patient Position: Lying)   Pulse 67   Temp 36.4 °C (97.5 °F) (Temporal)   Resp 18   Wt 67.5 kg (148 lb 12.8 oz)   SpO2 93%   Intake/Output last 3 Shifts:    Intake/Output Summary (Last 24 hours) at 12/17/2023 0919  Last data filed at 12/17/2023 0445  Gross per 24 hour   Intake 534.46 ml   Output 650 ml   Net -115.54 ml       Admission Weight  Weight: 68 kg (150 lb) (12/14/23 0844)    Daily Weight  12/17/23 : 67.5 kg (148 lb 12.8 oz)    Image Results  Vascular US Lower Extremity Venous Duplex Bilateral            [place] [id number]   Tel [phone] and Fax [phone]       Vascular Lab Report  VASC US LOWER EXTREMITY VENOUS DUPLEX BILATERAL       Patient Name:      ONDINA ROBLES            [place] Physician: [id number] Carla JACOBO MD  Study Date:        12/15/2023          Ordering           [id number] LATIA DUNHAM                                         Physician:  MRN/PID:           [id number]            Technologist:      Maria Teresa JACKSON  Accession#:        [id number]        Technologist 2:  Date of Birth/Age: 3/6/1928 / 89 years Encounter#:        [id number]  Gender:            M  Admission Status:  Outpatient          Location           [facility]                                         Performed:       Diagnosis/ICD: Shortness of breath-R06.02  CPT Codes:     22720 Peripheral venous duplex scan for DVT complete       **CRITICAL RESULT**  Critical Result: possible near occlusion/occlusion right distal femoral and popliteal  artery  Notification called to Irena Correa CNP on 12/15/2023 at 2:13:08 PM by RENETTA Apple.     CONCLUSIONS:  Right Lower Venous: No evidence of acute deep vein thrombus visualized in the right lower extremity. Cannot rule out thrombus in non-visualized distal femoral vein. Incidental finding plaque at right distal femoral artery and popliteal artery. Possible near occlusion. Monophasic flow is noted at right distal femoral, popliteal, post tibial, and peroneal artery.  Left Lower Venous: No evidence of acute deep vein thrombus visualized in the left lower extremity. Note made unable to fully compress left common femoral vein due to patient position and inability to tolerate compression. Cannot rule out thrombus in left common femoral vein.     Additional Findings:  Technically difficult exam is noted due to patient inability to cooperate and  tolerate compressions. Patient is noted to be on Air-Vo.       Imaging & Doppler Findings:     Right                 Compressible Thrombus        Flow  Distal External Iliac     Yes        None   Spontaneous/Phasic  CFV                       Yes        None   Spontaneous/Phasic  PFV                       Yes        None  FV Proximal               Yes        None   Spontaneous/Phasic  FV Mid                    Yes        None  Popliteal                 Yes        None   Spontaneous/Phasic  Peroneal                  Yes        None  PTV                       Yes        None       Left                  Compress Thrombus        Flow  Distal External Iliac   Yes      None   Spontaneous/Phasic  CFV                                     Spontaneous/Phasic  PFV                     Yes      None  FV Proximal             Yes      None   Spontaneous/Phasic  FV Mid                  Yes      None  FV Distal               Yes      None  Popliteal               Yes      None   Spontaneous/Phasic  Peroneal                Yes      None  PTV                     Yes      None       [id number]  Carla Bain MD  Electronically signed by [id number] Carla Bain MD on 12/15/2023 at 2:22:30 PM       ** Final **  Transthoracic Echo (TTE) Complete     [facility], [address] [place] [address]                Tel [phone] and Fax [phone]    TRANSTHORACIC ECHOCARDIOGRAM REPORT       Patient Name:      ONDINA ROBLES [facility] Reading Physician:    [id number] Jer Sullivan MD  Study Date:        12/15/2023           Ordering Provider:    [id number] ALISHA MARCUS  MRN/PID:           [id number]             Fellow:  Accession#:        [id number]         Nurse:  Date of Birth/Age: 3/6/1928 / 89 years  Sonographer:          Mary Gallegos RDCS  Gender:            M                    Additional Staff:  Height:            177.80 cm            Admit Date:           12/14/2023  Weight:            68.04 kg             Admission Status:     Inpatient -                                                                Routine  BSA:               1.85 m2              Encounter#:           [id number]                                          Department Location:  [address]  Blood Pressure: 137 /63 mmHg    Study Type:    TRANSTHORACIC ECHO (TTE) COMPLETE  Diagnosis/ICD: 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 elevated troponin, elevated BNP,                     echo 10/7/21 mod AS.    Study Detail: The following Echo studies were performed: 2D, M-Mode, Doppler and                color flow.       PHYSICIAN INTERPRETATION:  Left Ventricle: The left ventricular systolic function is low normal,  with an estimated ejection fraction of 50-55%. There are no regional wall motion abnormalities. The left ventricular cavity size is normal. Abnormal (paradoxical) septal motion, consistent with RV pacemaker. Spectral Doppler shows a pseudonormal pattern of left ventricular diastolic filling.  Left Atrium: The left atrium is moderately dilated.  Right Ventricle: The right ventricle is normal in size. There is normal right ventricular global systolic function. A device is visualized in the right ventricle.  Right Atrium: The right atrium is mildly dilated.  Aortic Valve: The aortic valve is trileaflet. There is evidence of moderate aortic valve stenosis.  There is no evidence of aortic valve regurgitation. The peak instantaneous gradient of the aortic valve is 27.7 mmHg. The mean gradient of the aortic valve is 15.9 mmHg.  Mitral Valve: The mitral valve is normal in structure. There is moderate mitral annular calcification. There is mild mitral valve regurgitation.  Tricuspid Valve: The tricuspid valve is structurally normal. There is mild to moderate tricuspid regurgitation.  Pulmonic Valve: The pulmonic valve is structurally normal. There is trace pulmonic valve regurgitation.  Pericardium: There is no pericardial effusion noted.  Aorta: The aortic root is normal.  Pulmonary Artery: The tricuspid regurgitant velocity is 3.03 m/s, and with an estimated right atrial pressure of 10 mmHg, the estimated pulmonary artery pressure is mildly elevated with the RVSP at 46.6 mmHg.  Systemic Veins: The inferior vena cava appears to be of normal size.       CONCLUSIONS:   1. Left ventricular systolic function is low normal with a 50-55% estimated ejection fraction.   2. Abnormal septal motion consistent with RV pacemaker.   3. Spectral Doppler shows a pseudonormal pattern of left ventricular diastolic filling.   4. The left atrium is moderately dilated.   5. There is moderate mitral annular calcification.   6. Mild to moderate  tricuspid regurgitation visualized.   7. Moderate aortic valve stenosis.   8. The estimated pulmonary artery pressure is mildly elevated with the RVSP at 46.6 mmHg.    QUANTITATIVE DATA SUMMARY:  2D MEASUREMENTS:                            Normal Ranges:  IVSd:          1.31 cm    (0.6-1.1cm)  LVPWd:         1.03 cm    (0.6-1.1cm)  LVIDd:         4.46 cm    (3.9-5.9cm)  LVIDs:         3.33 cm  LV Mass Index: 101.8 g/m2  LV % FS        25.4 %    LA VOLUME:                               Normal Ranges:  LA Vol A4C:       78.4 ml    (22+/-6mL/m2)  LA Vol A2C:       88.6 ml  LA Vol BP:        88.9 ml  LA Vol Index A4C: 42.5 ml/m2  LA Vol Index A2C: 48.0 ml/m2  LA Vol Index BP:  48.1 ml/m2  LA Volume Index:  48.0 ml/m2  LA Vol A4C:       72.8 ml  LA Vol A2C:       84.2 ml    RA VOLUME BY A/L METHOD:                        Normal Ranges:  RA Area A4C: 22.6 cm2    M-MODE MEASUREMENTS:                   Normal Ranges:  Ao Root: 3.60 cm (2.0-3.7cm)  LAs:     3.48 cm (2.7-4.0cm)    LV SYSTOLIC FUNCTION BY 2D PLANIMETRY (MOD):                      Normal Ranges:  EF-A4C View: 51.4 % (>=55%)  EF-A2C View: 51.7 %  EF-Biplane:  53.8 %    LV DIASTOLIC FUNCTION:                            Normal Ranges:  MV Peak E:    1.29 m/s    (0.7-1.2 m/s)  MV Peak A:    1.21 m/s    (0.42-0.7 m/s)  E/A Ratio:    1.07        (1.0-2.2)  MV e'         0.07 m/s    (>8.0)  MV lateral e' 0.07 m/s  MV A Dur:     136.10 msec  E/e' Ratio:   18.44       (<8.0)    MITRAL VALVE:                  Normal Ranges:  MV DT: 280 msec (150-240msec)    AORTIC VALVE:                                     Normal Ranges:  AoV Vmax:                2.63 m/s  (<=1.7m/s)  AoV Peak P.7 mmHg (<20mmHg)  AoV Mean PG:             15.9 mmHg (1.7-11.5mmHg)  LVOT Max Khadar:            0.89 m/s  (<=1.1m/s)  AoV VTI:                 61.94 cm  (18-25cm)  LVOT VTI:                24.39 cm  LVOT Diameter:           1.98 cm   (1.8-2.4cm)  AoV Area, VTI:           1.21 cm2   (2.5-5.5cm2)  AoV Area,Vmax:           1.04 cm2  (2.5-4.5cm2)  AoV Dimensionless Index: 0.39       RIGHT VENTRICLE:  TAPSE: 19.0 mm  RV s'  0.09 m/s    TRICUSPID VALVE/RVSP:                              Normal Ranges:  Peak TR Velocity: 3.03 m/s  RV Syst Pressure: 46.6 mmHg (< 30mmHg)    PULMONIC VALVE:                       Normal Ranges:  PV Max Khadar: 1.1 m/s  (0.6-0.9m/s)  PV Max P.6 mmHg       [id number] Jer Sullivan MD  Electronically signed on 12/15/2023 at 12:19:17 PM       ** Final **  NM Lung perfusion with spect/ct  Narrative: Interpreted By:  Roberto Arroyo,   STUDY:  NM LUNG PERFUSION WITH SPECT/CT;  2023 7:12 pm      INDICATION:  Signs/Symptoms:SOB, r/o PE.      COMPARISON:  None.      ACCESSION NUMBER(S):  [id number]      ORDERING CLINICIAN:  ALISHA MARCUS      TECHNIQUE:  DIVISION OF NUCLEAR MEDICINE  PERFUSION LUNG SCAN      Multiple perfusion images of the lungs were obtained after the  intravenous administration of 4.1 mCi of Tc-99m MAA. SPECT CT images  of the lungs were also obtained      FINDINGS:  Analysis of the images reveals significant heterogeneity of tracer  deposition throughout both lungs. Note is made of multiple areas of  diminished perfusion in both lower lung fields. However, findings are  confounded by bilateral pleural effusions/infiltrates.          Impression: Intermediate to high probability of acute pulmonary embolism. Further  evaluation and/or treatment is warranted.              I personally reviewed the images/study and I agree with the findings  as stated.  This study was interpreted at [facility], [place], [place].      MACRO:  Critical Finding:  See findings. Notification was initiated on  12/15/2023 at 6:09 am by  Roberto Arroyo.  (**-OCF-**) Instructions:          Signed by: Roberto Arroyo 12/15/2023 6:09 AM  Dictation workstation:   [id number]    Current Facility-Administered Medications:     acetaminophen (Tylenol) tablet  650 mg, 650 mg, oral, q4h PRN, Gavino Antonio MD    albuterol 2.5 mg /3 mL (0.083 %) nebulizer solution 2.5 mg, 2.5 mg, nebulization, q2h PRN, Gavino Antonio MD    amLODIPine (Norvasc) tablet 10 mg, 10 mg, oral, Daily, Gavino Antonio MD, 10 mg at 12/17/23 0917    ammonium lactate (Lac-Hydrin) 12 % lotion, , Topical, BID, Neda Leigh MD, Given at 12/17/23 0918    azithromycin (Zithromax) tablet 250 mg, 250 mg, oral, q24h YOLANDA, Gavino Antonio MD, 250 mg at 12/17/23 0917    docusate sodium (Colace) capsule 100 mg, 100 mg, oral, BID, Neda Leigh MD, 100 mg at 12/17/23 0917    furosemide (Lasix) injection 40 mg, 40 mg, intravenous, Daily, Neda Leigh MD, 40 mg at 12/17/23 0917    guaiFENesin (Mucinex) 12 hr tablet 600 mg, 600 mg, oral, BID PRN, Gavino Antonio MD    heparin (porcine) injection 2,000-4,000 Units, 2,000-4,000 Units, intravenous, q4h PRN, HALI Mayorga    heparin 25,000 Units in dextrose 5% 250 mL (100 Units/mL) infusion (premix), 0-4,500 Units/hr, intravenous, Continuous, HALI Mayorga, Last Rate: 0.1 mL/hr at 12/17/23 0003, 13.2 Units/hr at 12/17/23 0003    ipratropium-albuteroL (Duo-Neb) 0.5-2.5 mg/3 mL nebulizer solution 3 mL, 3 mL, nebulization, TID, Gavino Antonio MD, 3 mL at 12/17/23 0918    oxygen (O2) therapy, , inhalation, Continuous PRN - O2/gases, Neda Leigh MD, 50 L/min at 12/17/23 0333    pantoprazole (ProtoNix) EC tablet 40 mg, 40 mg, oral, Daily, Gavino Antonio MD, 40 mg at 12/17/23 0917    perflutren lipid microspheres (Definity) injection 0.5-10 mL of dilution, 0.5-10 mL of dilution, intravenous, Once in imaging, Gavino Antonio MD    perflutren lipid microspheres (Definity) injection 0.5-10 mL of dilution, 0.5-10 mL of dilution, intravenous, Once in imaging, Gavino Antonio MD    perflutren protein A microsphere (Optison) injection 0.5 mL, 0.5 mL, intravenous,  Once in imaging, Gavino Antonio MD    perflutren protein A microsphere (Optison) injection 0.5 mL, 0.5 mL, intravenous, Once in imaging, Gavino Antonio MD    polyethylene glycol (Glycolax, Miralax) packet 17 g, 17 g, oral, Daily, Gavino Antonio MD, 17 g at 12/16/23 0937    predniSONE (Deltasone) tablet 40 mg, 40 mg, oral, Daily, Tk Gupta MD, 40 mg at 12/17/23 0917    rOPINIRole (Requip) tablet 3 mg, 3 mg, oral, Daily, Gavino Antonio MD, 3 mg at 12/17/23 0917    sulfur hexafluoride microsphr (Lumason) injection 24.28 mg, 2 mL, intravenous, Once in imaging, Gavino Antonio MD    sulfur hexafluoride microsphr (Lumason) injection 24.28 mg, 2 mL, intravenous, Once in imaging, Gavino Antonio MD   Results for orders placed or performed during the hospital encounter of 12/14/23 (from the past 24 hour(s))   Magnesium   Result Value Ref Range    Magnesium 2.30 1.60 - 2.40 mg/dL   Basic metabolic panel   Result Value Ref Range    Glucose 149 (H) 74 - 99 mg/dL    Sodium 142 136 - 145 mmol/L    Potassium 4.3 3.5 - 5.3 mmol/L    Chloride 102 98 - 107 mmol/L    Bicarbonate 36 (H) 21 - 32 mmol/L    Anion Gap 8 (L) 10 - 20 mmol/L    Urea Nitrogen 50 (H) 6 - 23 mg/dL    Creatinine 1.61 (H) 0.50 - 1.30 mg/dL    eGFR 39 (L) >60 mL/min/1.73m*2    Calcium 8.7 8.6 - 10.3 mg/dL   CBC   Result Value Ref Range    WBC 7.9 4.4 - 11.3 x10*3/uL    nRBC 0.0 0.0 - 0.0 /100 WBCs    RBC 4.10 (L) 4.50 - 5.90 x10*6/uL    Hemoglobin 11.1 (L) 13.5 - 17.5 g/dL    Hematocrit 37.0 (L) 41.0 - 52.0 %    MCV 90 80 - 100 fL    MCH 27.1 26.0 - 34.0 pg    MCHC 30.0 (L) 32.0 - 36.0 g/dL    RDW 14.6 (H) 11.5 - 14.5 %    Platelets 97 (L) 150 - 450 x10*3/uL   Heparin Assay   Result Value Ref Range    Heparin Unfractionated 0.3 See Comment Below for Therapeutic Ranges IU/mL      Physical Exam  Constitutional:       Appearance: Normal appearance.   HENT:      Head: Normocephalic and atraumatic.       Nose: Nose normal.      Mouth/Throat:      Mouth: Mucous membranes are moist.   Eyes:      Extraocular Movements: Extraocular movements intact.      Pupils: Pupils are equal, round, and reactive to light.   Neck:      Comments: No jvd.   Cardiovascular:      Comments: Regular. Increased S2. 1/5 low systolic murmur  Very faint distal pulses, no edema  Pulmonary:      Effort: Pulmonary effort is normal.      Comments: No wheezing at this time. Decreased breath sounds in bases, few crackles in left base  Abdominal:      General: Bowel sounds are normal. There is distension.      Palpations: Abdomen is soft.   Musculoskeletal:      Comments: Severe stasis dermatitis, lower extremities. Scaling, better after some lac hydrin   Skin:     General: Skin is warm.   Neurological:      Mental Status: He is alert. Mental status is at baseline.   Psychiatric:         Mood and Affect: Mood normal.         Behavior: Behavior normal.         Relevant Results             Assessment/Plan           Principal Problem:    Acute pneumonia  Active Problems:    Shortness of breath    Acute hypoxic respiratory failure, secondary to PE, and possibly pneumonia as well. Has had worsening of hypoxic overnight, now on 50/60 airvo, although to look at him he appears comfortable. Abg and cxr pending. Pulm and cards on case as well. He did diurese yesterday, over 1000. Continue heparin, empiric atb.   Moderate aortic stenosis/AVR. Last echo 2 years ago, will order for tomorrow  Dementia  CVI  CKD, stage 4, stable. Avoid nephrotoxic agents, avoid over diuresing.   Hypertension, stable. Continue to monitor              Ndea Leigh MD

## 2023-12-17 NOTE — PROGRESS NOTES
Subjective Data:  Weaned to high flow NC at 9L yesterday, however, had worsening hypoxia overnight with SPO2 to the 70's during sleep.  Now back on airvo and receiving percussion therapy with nebulizer treatment.  He is alert x3-- appears comfortable at rest and denies feeling SOB at rest.         Objective Data:  Last Recorded Vitals:  Vitals:    12/17/23 0333 12/17/23 0530 12/17/23 0918 12/17/23 1027   BP:  136/78  112/63   BP Location:  Left arm  Right arm   Patient Position:  Lying  Lying   Pulse:  67  70   Resp:  18  17   Temp:  36.4 °C (97.5 °F)  36.7 °C (98.1 °F)   TempSrc:  Temporal  Temporal   SpO2: 97% 93% 100% 97%   Weight:  67.5 kg (148 lb 12.8 oz)     Height:           Last Labs:  CBC - 12/17/2023:  5:53 AM  7.9 11.1 97    37.0      CMP - 12/17/2023:  5:53 AM  8.7 6.1 14 --- 0.6   _ 3.8 7 68      PTT - No results in last year.  _   _ _     TROPHS   Date/Time Value Ref Range Status   12/14/2023 10:19 AM 49 0 - 20 ng/L Final   12/14/2023 08:43 AM 46 0 - 20 ng/L Final     BNP   Date/Time Value Ref Range Status   12/14/2023 08:43  0 - 99 pg/mL Final   10/05/2021 03:06  0 - 99 pg/mL Final     Comment:     .  <100 pg/mL - Heart failure unlikely  100-299 pg/mL - Intermediate probability of acute heart  .               failure exacerbation. Correlate with clinical  .               context and patient history.    >=300 pg/mL - Heart Failure likely. Correlate with clinical  .               context and patient history.  BNP testing is performed using different testing   methodology at [facility] than at other   [facility] hospitals. Direct result comparisons should   only be made within the same method.     03/24/2021 11:50  0 - 99 pg/mL Final     Comment:     .  <100 pg/mL - Heart failure unlikely  100-299 pg/mL - Intermediate probability of acute heart  .               failure exacerbation. Correlate with clinical  .               context and patient history.    >=300 pg/mL - Heart  Failure likely. Correlate with clinical  .               context and patient history.  BNP testing is performed using different testing   methodology at [facility] than at other   [facility] hospitals. Direct result comparisons should   only be made within the same method.       HGBA1C   Date/Time Value Ref Range Status   10/06/2021 07:38 AM 6.8 % Final     Comment:          Diagnosis of Diabetes-Adults   Non-Diabetic: < or = 5.6%   Increased risk for developing diabetes: 5.7-6.4%   Diagnostic of diabetes: > or = 6.5%  .       Monitoring of Diabetes                Age (y)     Therapeutic Goal (%)   Adults:          >18           <7.0   Pediatrics:    13-18           <7.5                   7-12           <8.0                   0- 6            7.5-8.5   American Diabetes Association. Diabetes Care 33(S1), Jan 2010.        Last I/O:  I/O last 3 completed shifts:  In: 699.1 (10.4 mL/kg) [P.O.:360; I.V.:339.1 (5 mL/kg)]  Out: 2000 (29.6 mL/kg) [Urine:2000 (0.8 mL/kg/hr)]  Weight: 67.5 kg     Past Cardiology Tests (Last 3 Years):  Echo:  Transthoracic Echo (TTE) Complete 12/15/2023  CONCLUSIONS:   1. Left ventricular systolic function is low normal with a 50-55% estimated ejection fraction.   2. Abnormal septal motion consistent with RV pacemaker.   3. Spectral Doppler shows a pseudonormal pattern of left ventricular diastolic filling.   4. The left atrium is moderately dilated.   5. There is moderate mitral annular calcification.   6. Mild to moderate tricuspid regurgitation visualized.   7. Moderate aortic valve stenosis.   8. The estimated pulmonary artery pressure is mildly elevated with the RVSP at 46.6 mmHg.      Inpatient Medications:  Scheduled medications   Medication Dose Route Frequency    amLODIPine  10 mg oral Daily    ammonium lactate   Topical BID    azithromycin  250 mg oral q24h YOLANDA    docusate sodium  100 mg oral BID    furosemide  40 mg intravenous Daily    ipratropium-albuteroL  3 mL  nebulization TID    pantoprazole  40 mg oral Daily    perflutren lipid microspheres  0.5-10 mL of dilution intravenous Once in imaging    perflutren lipid microspheres  0.5-10 mL of dilution intravenous Once in imaging    perflutren protein A microsphere  0.5 mL intravenous Once in imaging    perflutren protein A microsphere  0.5 mL intravenous Once in imaging    polyethylene glycol  17 g oral Daily    predniSONE  40 mg oral Daily    rOPINIRole  3 mg oral Daily    sulfur hexafluoride microsphr  2 mL intravenous Once in imaging    sulfur hexafluoride microsphr  2 mL intravenous Once in imaging     PRN medications   Medication    acetaminophen    albuterol    guaiFENesin    heparin    oxygen     Continuous Medications   Medication Dose Last Rate    heparin  0-4,500 Units/hr 13.2 Units/hr (12/17/23 0003)       Physical Exam:  Constitutional: Pleasant, Awake/Alert/Oriented to person place and time.  Head: Atraumatic, Normocephalic  Eyes: EOMI. RICHARD  Neck: No JVD  Cardiovascular: Regular rate and rhythm, S1, S2. 2/6 murmur RUSB   Respiratory: Clear to auscultation bilaterally. No wheezing, rales or rhonchi. Good chest wall expansion  Abdomen: Soft, Nontender, Obese. Bowel sounds appreciated  Musculoskeletal: ROM intact. Muscle strength grossly intact upper and lower extremities 5/5.   Neurological: CNII-XII intact. Sensation grossly intact  Extremities: Warm and dry. BLE CVI with taught skin, LLE with serous drainage posteriorly   Psychiatric: Appropriate mood and affect       Assessment/Plan   89 y.o. 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      Assessment:  Acute hypoxic respiratory failure   Intermediate to high probability for acute PE (VQ 12/15/2023), no RV strain on echo   Pneumonia  Chronically elevated left hemidiaphragm   CKD (BL creat 1.8-2)  Moderate aortic stenosis (unchanged from 2021)      Plan:  - Worsening hypoxia during sleep overnight-- ?JONNY driven event. Appears euvolemic on exam currently. CXR is pending and if vascular congestion is improved recommend transition to oral lasix at increased dose of 40mg daily.   - Continue heparin with plan to transition to DOAC after improvement in O2 requirement  - Can continue amlodipine 10mg daily   - Wean airvo as tolerated for SPO2 >92%   - Antibiotics per the primary team   -We will sign off. Please call with questions       Peripheral IV 12/14/23 18 G Right Antecubital (Active)   Site Assessment Clean;Dry;Intact 12/17/23 0800   Dressing Status Clean;Dry 12/17/23 0800   Number of days: 3       Code Status:  Full Code    JAMAICA Mayorga-CNP

## 2023-12-17 NOTE — CARE PLAN
Problem: Safety  Goal: Patient will be injury free during hospitalization  Outcome: Progressing  Goal: I will remain free of falls  Outcome: Progressing     Problem: Fall/Injury  Goal: Not fall by end of shift  Outcome: Progressing     Problem: Daily Care  Goal: Daily care needs are met  Outcome: Progressing   The patient's goals for the shift include  comfort    The clinical goals for the shift include safety    Over the shift, the patient did not make progress toward the following goals. Barriers to progression include none. Recommendations to address these barriers include none.

## 2023-12-17 NOTE — CARE PLAN
Problem: Safety  Goal: Patient will be injury free during hospitalization  12/16/2023 2144 by Víctor Duff RN  Outcome: Progressing  12/16/2023 2143 by Víctor Duff RN  Outcome: Progressing  Goal: I will remain free of falls  12/16/2023 2144 by Víctor Duff RN  Outcome: Progressing  12/16/2023 2143 by Víctor Duff RN  Outcome: Progressing     Problem: Fall/Injury  Goal: Not fall by end of shift  12/16/2023 2144 by Víctor Duff RN  Outcome: Progressing  12/16/2023 2143 by Víctor Duff RN  Outcome: Progressing     Problem: Daily Care  Goal: Daily care needs are met  12/16/2023 2144 by Víctor Duff RN  Outcome: Progressing  12/16/2023 2143 by Víctor Duff RN  Outcome: Progressing     Problem: Skin  Goal: Decreased wound size/increased tissue granulation at next dressing change  Outcome: Progressing  Goal: Participates in plan/prevention/treatment measures  Outcome: Progressing  Goal: Prevent/manage excess moisture  Outcome: Progressing  Goal: Prevent/minimize sheer/friction injuries  Outcome: Progressing  Goal: Promote/optimize nutrition  Outcome: Progressing  Goal: Promote skin healing  Outcome: Progressing   The patient's goals for the shift include  comfort    The clinical goals for the shift include safety    Over the shift, the patient did not make progress toward the following goals. Barriers to progression include none. Recommendations to address these barriers include none.

## 2023-12-17 NOTE — PROGRESS NOTES
12/15/23 1635   Discharge Planning   Living Arrangements Alone   Support Systems Family members   Assistance Needed Patient is from [facility]. Called [facility] and spoke with Daniela she stated that patient is A&OX3, from [facility], wheelchair bound and wears 4-5 liters of oxygen at baseline.Patient currently on AIRVO.   Type of Residence Skilled nursing facility   Home or Post Acute Services Post acute facilities (Rehab/SNF/etc)   Type of Post Acute Facility Services Long term care   Patient expects to be discharged to: Patient to return to [facility] once medically ready.   Does the patient need discharge transport arranged? Yes   RoundTrip coordination needed? Yes   Has discharge transport been arranged? No     12/17/2023: Patient on heparin gtt, ready to transition to DOAC per pulmonology. Patient down to 8L HFNC. Cardiology has signed off. Plan remains unchanged, patient to DC to [facility] when medically ready.

## 2023-12-17 NOTE — SIGNIFICANT EVENT
SpO2 73% on 40 liters and 50%. Increased in increments until SpO2 93%. FiO2 70% and 50 liters via Airvo. RN at bedside as well.

## 2023-12-17 NOTE — PROGRESS NOTES
"Terrell Du is a 89 y.o. male on day 3 of admission presenting with Acute pneumonia.    Subjective   Feels dyspnea is improved today.  Had increase in O2 requirements overnight though.       Objective     Physical Exam  Vitals reviewed.   Constitutional:       Appearance: He is ill-appearing.   HENT:      Head: Normocephalic and atraumatic.   Eyes:      Extraocular Movements: Extraocular movements intact.   Cardiovascular:      Rate and Rhythm: Normal rate and regular rhythm.      Heart sounds: Normal heart sounds.   Pulmonary:      Effort: Pulmonary effort is normal.      Breath sounds: Decreased breath sounds present.   Abdominal:      Palpations: Abdomen is soft.      Tenderness: There is no abdominal tenderness.   Musculoskeletal:      Cervical back: Normal range of motion.   Skin:     General: Skin is warm.   Neurological:      General: No focal deficit present.      Mental Status: He is alert and oriented to person, place, and time. Mental status is at baseline.   Psychiatric:         Mood and Affect: Mood normal.         Behavior: Behavior normal.         Last Recorded Vitals  Blood pressure 112/63, pulse 70, temperature 36.7 °C (98.1 °F), temperature source Temporal, resp. rate 17, height 1.778 m (5' 10\"), weight 67.5 kg (148 lb 12.8 oz), SpO2 97 %.  Intake/Output last 3 Shifts:  I/O last 3 completed shifts:  In: 699.1 (10.4 mL/kg) [P.O.:360; I.V.:339.1 (5 mL/kg)]  Out: 2000 (29.6 mL/kg) [Urine:2000 (0.8 mL/kg/hr)]  Weight: 67.5 kg     Relevant Results                Scheduled medications  amLODIPine, 10 mg, oral, Daily  ammonium lactate, , Topical, BID  azithromycin, 250 mg, oral, q24h YOLANDA  docusate sodium, 100 mg, oral, BID  furosemide, 40 mg, intravenous, Daily  ipratropium-albuteroL, 3 mL, nebulization, TID  pantoprazole, 40 mg, oral, Daily  perflutren lipid microspheres, 0.5-10 mL of dilution, intravenous, Once in imaging  perflutren lipid microspheres, 0.5-10 mL of dilution, intravenous, Once " in imaging  perflutren protein A microsphere, 0.5 mL, intravenous, Once in imaging  perflutren protein A microsphere, 0.5 mL, intravenous, Once in imaging  piperacillin-tazobactam, 3.375 g, intravenous, TID  polyethylene glycol, 17 g, oral, Daily  predniSONE, 40 mg, oral, Daily  rOPINIRole, 3 mg, oral, Daily  sulfur hexafluoride microsphr, 2 mL, intravenous, Once in imaging  sulfur hexafluoride microsphr, 2 mL, intravenous, Once in imaging      Continuous medications  heparin, 0-4,500 Units/hr, Last Rate: 13.2 Units/hr (12/17/23 0003)      PRN medications  PRN medications: acetaminophen, albuterol, guaiFENesin, heparin, oxygen    Results for orders placed or performed during the hospital encounter of 12/14/23 (from the past 24 hour(s))   Magnesium   Result Value Ref Range    Magnesium 2.30 1.60 - 2.40 mg/dL   Basic metabolic panel   Result Value Ref Range    Glucose 149 (H) 74 - 99 mg/dL    Sodium 142 136 - 145 mmol/L    Potassium 4.3 3.5 - 5.3 mmol/L    Chloride 102 98 - 107 mmol/L    Bicarbonate 36 (H) 21 - 32 mmol/L    Anion Gap 8 (L) 10 - 20 mmol/L    Urea Nitrogen 50 (H) 6 - 23 mg/dL    Creatinine 1.61 (H) 0.50 - 1.30 mg/dL    eGFR 39 (L) >60 mL/min/1.73m*2    Calcium 8.7 8.6 - 10.3 mg/dL   CBC   Result Value Ref Range    WBC 7.9 4.4 - 11.3 x10*3/uL    nRBC 0.0 0.0 - 0.0 /100 WBCs    RBC 4.10 (L) 4.50 - 5.90 x10*6/uL    Hemoglobin 11.1 (L) 13.5 - 17.5 g/dL    Hematocrit 37.0 (L) 41.0 - 52.0 %    MCV 90 80 - 100 fL    MCH 27.1 26.0 - 34.0 pg    MCHC 30.0 (L) 32.0 - 36.0 g/dL    RDW 14.6 (H) 11.5 - 14.5 %    Platelets 97 (L) 150 - 450 x10*3/uL   Heparin Assay   Result Value Ref Range    Heparin Unfractionated 0.3 See Comment Below for Therapeutic Ranges IU/mL                  Assessment/Plan   Principal Problem:    Acute pneumonia  Active Problems:    Shortness of breath     89 yo man w/ h/o COPD admitted w/ acute hypoxic resp failure     Acute hypoxic resp failure 2/2 PE - On 4L.  Wean O2 as tolerated.  Cont   IPV and incentive spirometry.  Small effusions noted.  Agree with diuresis for now.   Repeat procal     Possible COPD exacerbation - cont prednisone.  Cont nebs     PE - intermed to high prob for PE.  Will presume this is PE at this time likely due to immobility and would need indefinite anticoagulation if can tolerate.  On hep gtt.  Can transition to DOAC.     Tk Gupta MD

## 2023-12-17 NOTE — CARE PLAN
Problem: Safety  Goal: Patient will be injury free during hospitalization  Outcome: Progressing  Goal: I will remain free of falls  Outcome: Progressing     Problem: Fall/Injury  Goal: Not fall by end of shift  Outcome: Progressing     Problem: Daily Care  Goal: Daily care needs are met  Outcome: Progressing     Problem: Skin  Goal: Decreased wound size/increased tissue granulation at next dressing change  Outcome: Progressing  Flowsheets (Taken 12/17/2023 1337)  Decreased wound size/increased tissue granulation at next dressing change:   Promote sleep for wound healing   Utilize specialty bed per algorithm   Protective dressings over bony prominences  Goal: Participates in plan/prevention/treatment measures  Outcome: Progressing  Flowsheets (Taken 12/17/2023 1332)  Participates in plan/prevention/treatment measures:   Discuss with provider PT/OT consult   Elevate heels   Increase activity/out of bed for meals  Goal: Prevent/manage excess moisture  Outcome: Progressing  Goal: Prevent/minimize sheer/friction injuries  Outcome: Progressing  Flowsheets (Taken 12/17/2023 1334)  Prevent/minimize sheer/friction injuries:   Complete micro-shifts as needed if patient unable. Adjust patient position to relieve pressure points, not a full turn   Increase activity/out of bed for meals   Use pull sheet  Goal: Promote/optimize nutrition  Outcome: Progressing  Goal: Promote skin healing  Outcome: Progressing   The patient's goals for the shift include      The clinical goals for the shift include safety

## 2023-12-18 NOTE — PROGRESS NOTES
Terrell Du is a 89 y.o. male on day 4 of admission presenting with Acute pneumonia.      Subjective   Feeling ok and would like to leave soon.        Objective     Last Recorded Vitals  /69   Pulse 70   Temp 36.9 °C (98.4 °F)   Resp 20   Wt 67.3 kg (148 lb 4.8 oz)   SpO2 95%   Intake/Output last 3 Shifts:    Intake/Output Summary (Last 24 hours) at 12/18/2023 1129  Last data filed at 12/18/2023 1000  Gross per 24 hour   Intake 850.73 ml   Output 1900 ml   Net -1049.27 ml       Admission Weight  Weight: 68 kg (150 lb) (12/14/23 0844)    Daily Weight  12/18/23 : 67.3 kg (148 lb 4.8 oz)    Image Results  XR chest 1 view  Narrative: Interpreted By:  Grecia Robles,   STUDY:  XR CHEST 1 VIEW;  12/17/2023 10:01 am      INDICATION:  Signs/Symptoms:hypoxia.      COMPARISON:  12/14/2023      ACCESSION NUMBER(S):  [id number]      ORDERING CLINICIAN:  CRISTIANE KONG      TECHNIQUE:  Portable AP upright      FINDINGS:  ICD in the left chest wall and electrode wires in right atrium and  right ventricle are unchanged. Sternotomy wires are unchanged.  Thoracolumbar fixation hardware and right shoulder prosthesis are  also noted and unchanged.      Cardiac silhouette is enlarged. Opacity is noted at the thoracic base  is consistent with a combination of atelectasis and pleural fluid,  with right appearing worse than the prior study and left not  significantly changed. Elevation of the left hemidiaphragm is  unchanged.      Impression: Cardiomegaly, unchanged      Atelectasis and pleural fluid right thoracic base are new. Left  hemidiaphragm elevation, left basilar atelectasis and left pleural  fluid are similar to the prior study      MACRO:  None.      Signed by: Grecia Robles 12/17/2023 3:06 PM  Dictation workstation:   [id number]    Results for orders placed or performed during the hospital encounter of 12/14/23 (from the past 24 hour(s))   CBC and Auto Differential   Result Value Ref Range    WBC 7.3  4.4 - 11.3 x10*3/uL    nRBC 0.0 0.0 - 0.0 /100 WBCs    RBC 4.36 (L) 4.50 - 5.90 x10*6/uL    Hemoglobin 11.6 (L) 13.5 - 17.5 g/dL    Hematocrit 39.3 (L) 41.0 - 52.0 %    MCV 90 80 - 100 fL    MCH 26.6 26.0 - 34.0 pg    MCHC 29.5 (L) 32.0 - 36.0 g/dL    RDW 14.6 (H) 11.5 - 14.5 %    Platelets 99 (L) 150 - 450 x10*3/uL    Neutrophils % 51.5 40.0 - 80.0 %    Immature Granulocytes %, Automated 0.4 0.0 - 0.9 %    Lymphocytes % 40.7 13.0 - 44.0 %    Monocytes % 7.3 2.0 - 10.0 %    Eosinophils % 0.0 0.0 - 6.0 %    Basophils % 0.1 0.0 - 2.0 %    Neutrophils Absolute 3.75 1.60 - 5.50 x10*3/uL    Immature Granulocytes Absolute, Automated 0.03 0.00 - 0.50 x10*3/uL    Lymphocytes Absolute 2.96 0.80 - 3.00 x10*3/uL    Monocytes Absolute 0.53 0.05 - 0.80 x10*3/uL    Eosinophils Absolute 0.00 0.00 - 0.40 x10*3/uL    Basophils Absolute 0.01 0.00 - 0.10 x10*3/uL   B-type natriuretic peptide   Result Value Ref Range     (H) 0 - 99 pg/mL   Heparin Assay   Result Value Ref Range    Heparin Unfractionated 0.3 See Comment Below for Therapeutic Ranges IU/mL        Current Facility-Administered Medications:     acetaminophen (Tylenol) tablet 650 mg, 650 mg, oral, q4h PRN, Gavino Antonio MD    albuterol 2.5 mg /3 mL (0.083 %) nebulizer solution 2.5 mg, 2.5 mg, nebulization, q2h PRN, Gavino Antonio MD    amLODIPine (Norvasc) tablet 10 mg, 10 mg, oral, Daily, Gavino Antonio MD, 10 mg at 12/18/23 0836    ammonium lactate (Lac-Hydrin) 12 % lotion, , Topical, BID, Neda Leigh MD, Given at 12/18/23 0836    apixaban (Eliquis) tablet 10 mg, 10 mg, oral, BID **FOLLOWED BY** [START ON 12/25/2023] apixaban (Eliquis) tablet 5 mg, 5 mg, oral, BID, Neda Leigh MD    azithromycin (Zithromax) tablet 250 mg, 250 mg, oral, q24h YOLANDA, Neda Leigh MD, 250 mg at 12/18/23 0836    docusate sodium (Colace) capsule 100 mg, 100 mg, oral, BID, Neda Leigh MD, 100 mg at 12/18/23 0836    [Held by provider]  furosemide (Lasix) injection 40 mg, 40 mg, intravenous, Daily, Neda Leigh MD, 40 mg at 12/18/23 0835    guaiFENesin (Mucinex) 12 hr tablet 600 mg, 600 mg, oral, BID PRN, Gavino Antonio MD    heparin (porcine) injection 2,000-4,000 Units, 2,000-4,000 Units, intravenous, q4h PRN, HALI Mayorga    ipratropium-albuteroL (Duo-Neb) 0.5-2.5 mg/3 mL nebulizer solution 3 mL, 3 mL, nebulization, TID, Gavino Antonio MD, 3 mL at 12/18/23 0750    oxygen (O2) therapy, , inhalation, Continuous PRN - O2/gases, Neda Leigh MD, 3 L/min at 12/18/23 0750    pantoprazole (ProtoNix) EC tablet 40 mg, 40 mg, oral, Daily, Gavino Antonio MD, 40 mg at 12/18/23 0836    perflutren lipid microspheres (Definity) injection 0.5-10 mL of dilution, 0.5-10 mL of dilution, intravenous, Once in imaging, Gavino Antonio MD    perflutren lipid microspheres (Definity) injection 0.5-10 mL of dilution, 0.5-10 mL of dilution, intravenous, Once in imaging, Gavino Antonio MD    perflutren protein A microsphere (Optison) injection 0.5 mL, 0.5 mL, intravenous, Once in imaging, Gavino Antonio MD    perflutren protein A microsphere (Optison) injection 0.5 mL, 0.5 mL, intravenous, Once in imaging, Gavino Antonio MD    piperacillin-tazobactam-dextrose (Zosyn) IV 3.375 g, 3.375 g, intravenous, TID, Neda Leigh MD, Stopped at 12/18/23 0904    polyethylene glycol (Glycolax, Miralax) packet 17 g, 17 g, oral, Daily, Gavino Antonio MD, 17 g at 12/18/23 0835    predniSONE (Deltasone) tablet 40 mg, 40 mg, oral, Daily, Tk Gupta MD, 40 mg at 12/18/23 0836    rOPINIRole (Requip) tablet 3 mg, 3 mg, oral, Daily, Gavino Antonio MD, 3 mg at 12/18/23 0836    sulfur hexafluoride microsphr (Lumason) injection 24.28 mg, 2 mL, intravenous, Once in imaging, Gavino Antonio MD    sulfur hexafluoride microsphr (Lumason) injection 24.28 mg, 2 mL, intravenous,  Once in imaging, Gavino Antonio MD   Physical Exam  Constitutional:       Appearance: Normal appearance.   HENT:      Head: Normocephalic and atraumatic.      Ears:      Comments: Slightly hard of hearing     Nose: Nose normal.      Mouth/Throat:      Mouth: Mucous membranes are moist.   Eyes:      Extraocular Movements: Extraocular movements intact.      Pupils: Pupils are equal, round, and reactive to light.   Neck:      Comments: No jvd  Cardiovascular:      Comments: Regular, 2/6 low systolic murmur  No real good distal pulses  Pulmonary:      Comments: Rhonchi, crackles, left side.   Right lung more clear. Decreased breath sounds in bases bilaterally  Abdominal:      General: Bowel sounds are normal.      Palpations: Abdomen is soft.   Musculoskeletal:      Comments: Generalized muscle atrophy  Marked stasis dermatitis BLE   Skin:     General: Skin is warm and dry.   Neurological:      Mental Status: He is alert. Mental status is at baseline.   Psychiatric:         Mood and Affect: Mood normal.         Behavior: Behavior normal.         Relevant Result       Assessment/Plan                  Principal Problem:    Acute pneumonia  Active Problems:    Shortness of breath  Presumed acute PE. Pulm evaluated, intermediate v/q scan, felt we should treat. Today, will transition to eliquis, from heparin infusion he has been on. Echo is pending.   Acute on chronic hypoxic respiratory failure, secondary to above and possible pneumonia. Today is last dose of zithro. CXR with effusions as well, chronic elevation of left hemidiaphragm. Continue nebs. He is chronically on 4-5 liters NC  Aortic stenosis. Echo pending  Dementia, mild  Thrombocytopenia, chronic  Hx of malignant melanoma  CAD, with mild cardiomyopathy. Follow up on echo. Appreciate cards input.   LILIA-small bump in creat, will hold lasix.   Possible discharge tomorrow if no major issues, renal function stabilizes              Neda Leigh MD

## 2023-12-18 NOTE — CARE PLAN
The patient's goals for the shift include  to be alert and oriented this shift.     The clinical goals for the shift include pt will be weaned to 2L O2 this shift

## 2023-12-18 NOTE — PROGRESS NOTES
12/18/23 1142   Discharge Planning   Living Arrangements   ([facility])   Support Systems Family members   Assistance Needed Patient is A&OX3, from [facility], wheelchair bound and wears 4-5 liters of oxygen at baseline.   Type of Residence Nursing home/residential care   Who is requesting discharge planning? Provider   Home or Post Acute Services Post acute facilities (Rehab/SNF/etc)   Type of Post Acute Facility Services Long term care   Patient expects to be discharged to: [facility], currently on 3L NC   Does the patient need discharge transport arranged? Yes   RoundTrip coordination needed? Yes   Has discharge transport been arranged? No   Patient Choice   Provider Choice list and CMS website (https://medicare.gov/care-compare#search) for post-acute Quality and Resource Measure Data were provided and reviewed with: Patient   Patient / Family choosing to utilize agency / facility established prior to hospitalization Yes

## 2023-12-19 NOTE — CARE PLAN
The patient's goals for the shift include      The clinical goals for the shift include patient will maintain SpO2 92% or ggreater throughout shift    Over the shift, the patient did not make progress toward the following goals. Barriers to progression include . Recommendations to address these barriers include .  Patient maintained Sp02 888-94 % on 02 2 L nc.

## 2023-12-19 NOTE — DISCHARGE SUMMARY
Discharge Diagnosis  Acute pneumonia    Issues Requiring Follow-Up  Needs PCP follow up at facility for likely JONNY    Discharge Meds     Your medication list        START taking these medications        Instructions Last Dose Given Next Dose Due   amoxicillin-pot clavulanate 875-125 mg tablet  Commonly known as: Augmentin      Take 1 tablet (875 mg) by mouth 2 times a day for 4 days.       apixaban 5 mg tablet  Commonly known as: Eliquis      Take 2 tablets (10 mg) by mouth 2 times a day for 7 doses.       apixaban 5 mg tablet  Commonly known as: Eliquis  Start taking on: December 26, 2023      Take 1 tablet (5 mg) by mouth 2 times a day. Do not start before December 26, 2023.              CHANGE how you take these medications        Instructions Last Dose Given Next Dose Due   ipratropium-albuteroL 0.5-2.5 mg/3 mL nebulizer solution  Commonly known as: Duo-Neb  What changed: Another medication with the same name was removed. Continue taking this medication, and follow the directions you see here.           Combivent Respimat  mcg/actuation inhaler  Generic drug: ipratropium-albuteroL  What changed: Another medication with the same name was removed. Continue taking this medication, and follow the directions you see here.           furosemide 20 mg tablet  Commonly known as: Lasix  What changed: how much to take      Take 2 tablets (40 mg) by mouth once daily.       predniSONE 20 mg tablet  Commonly known as: Deltasone  What changed:   how much to take  additional instructions  Another medication with the same name was removed. Continue taking this medication, and follow the directions you see here.      Take 2 tablets (40 mg) by mouth once daily for 5 days.              CONTINUE taking these medications        Instructions Last Dose Given Next Dose Due   acetaminophen 325 mg tablet  Commonly known as: Tylenol           amLODIPine 10 mg tablet  Commonly known as: Norvasc           ammonium lactate 12 %  cream  Commonly known as: Amlactin           benzonatate 100 mg capsule  Commonly known as: Tessalon           cetirizine 10 mg tablet  Commonly known as: ZyrTEC           Fish OiL 1,000 mg (120 mg-180 mg) capsule  Generic drug: omega 3-dha-epa-fish oil           fluticasone 50 mcg/actuation nasal spray  Commonly known as: Flonase           guaiFENesin 1,200 mg tablet extended release 12hr  Commonly known as: Mucinex           pantoprazole 40 mg EC tablet  Commonly known as: ProtoNix           rOPINIRole 3 mg tablet  Commonly known as: Requip                  STOP taking these medications      azithromycin 250 mg tablet  Commonly known as: Zithromax        azithromycin 500 mg tablet  Commonly known as: Zithromax                  Where to Get Your Medications        Information about where to get these medications is not yet available    Ask your nurse or doctor about these medications  amoxicillin-pot clavulanate 875-125 mg tablet  apixaban 5 mg tablet  apixaban 5 mg tablet  furosemide 20 mg tablet  predniSONE 20 mg tablet         Test Results Pending At Discharge  Pending Labs       Order Current Status    Procalcitonin In process    Staphylococcus aureus/MRSA colonization, Culture In process            Hospital Course   89yo CM with PMH of CHF, HTN, GERD, COPD on 3-4L PRN at baseline, diffuse OA, RLS, and history of melanoma that presented to the ED with worsening shortness of breath and was admitted for acute on chronic hypoxic respiratory failure. Patient was seen by pulmonology for COPD exacerbation and new PE with steroids, antibiotics, and heparin drip which was transitioned to eliquis. Patient was also seen by cardiology for heart failure exacerbation and treated with IV diuresis and then transitioned to higher lasix dose. Patient appears medically stable for discharge on supplemental oxygen with recommendations for sleep study to be done as he did have desaturations while sleeping. Patient appears medically  stable for discharge back to facility.    Medically cleared for discharge. Medications reviewed and reconciled. Scripts prepared as needed.  Discussed with the family, , and . Questions answered. Understanding verbalized. Overall time spent on discharge was longer than 35 min.     Pertinent Physical Exam At Time of Discharge  Constitutional: alert and oriented x 3, awake, cooperative, no acute distress  Skin: warm and dry  Head/Neck: Normocephalic, atraumatic  Eyes: clear sclera  ENMT: mucous membranes moist  Cardio: Regular rate and rhythm, grade II/VI systolic murmur  Resp: Coarse breath sounds bilaterally, good respiratory effort  Gastrointestinal: Soft, nontender, nondistended  Musculoskeletal: generalized weakness  Neuro: alert and oriented x 3  Psychological: Appropriate mood and behavior      Outpatient Follow-Up  Future Appointments   Date Time Provider Department Center   3/4/2024 12:30 PM JAMAICA Lopes-CNP [id number] [facility]   6/12/2024  1:00 PM May Blevins MD [id number] [facility]         Vidhya Lo DO

## 2023-12-19 NOTE — PROGRESS NOTES
"Terrell Du is a 89 y.o. male on day 5 of admission presenting with Acute pneumonia.    Subjective   Feels better today       Objective     Physical Exam  Vitals reviewed.   Constitutional:       Appearance: He is ill-appearing.   HENT:      Head: Normocephalic and atraumatic.   Eyes:      Extraocular Movements: Extraocular movements intact.   Cardiovascular:      Rate and Rhythm: Normal rate and regular rhythm.      Heart sounds: Normal heart sounds.   Pulmonary:      Effort: Pulmonary effort is normal.      Breath sounds: Decreased breath sounds present.   Abdominal:      Palpations: Abdomen is soft.      Tenderness: There is no abdominal tenderness.   Musculoskeletal:      Cervical back: Normal range of motion.   Skin:     General: Skin is warm.   Neurological:      General: No focal deficit present.      Mental Status: He is alert and oriented to person, place, and time. Mental status is at baseline.   Psychiatric:         Mood and Affect: Mood normal.         Behavior: Behavior normal.         Last Recorded Vitals  Blood pressure 99/57, pulse 69, temperature 37.1 °C (98.8 °F), resp. rate 20, height 1.778 m (5' 10\"), weight 66.8 kg (147 lb 3.2 oz), SpO2 92 %.  Intake/Output last 3 Shifts:  I/O last 3 completed shifts:  In: 1195.6 (17.9 mL/kg) [P.O.:960; I.V.:35.6 (0.5 mL/kg); IV Piggyback:200]  Out: 3325 (49.8 mL/kg) [Urine:3325 (1.4 mL/kg/hr)]  Weight: 66.8 kg     Relevant Results                Scheduled medications      Continuous medications      PRN medications      Results for orders placed or performed during the hospital encounter of 12/14/23 (from the past 24 hour(s))   CBC   Result Value Ref Range    WBC 7.3 4.4 - 11.3 x10*3/uL    nRBC 0.0 0.0 - 0.0 /100 WBCs    RBC 4.31 (L) 4.50 - 5.90 x10*6/uL    Hemoglobin 11.6 (L) 13.5 - 17.5 g/dL    Hematocrit 38.2 (L) 41.0 - 52.0 %    MCV 89 80 - 100 fL    MCH 26.9 26.0 - 34.0 pg    MCHC 30.4 (L) 32.0 - 36.0 g/dL    RDW 14.6 (H) 11.5 - 14.5 %    " Platelets 97 (L) 150 - 450 x10*3/uL   Basic Metabolic Panel   Result Value Ref Range    Glucose 171 (H) 74 - 99 mg/dL    Sodium 141 136 - 145 mmol/L    Potassium 4.2 3.5 - 5.3 mmol/L    Chloride 97 (L) 98 - 107 mmol/L    Bicarbonate 39 (H) 21 - 32 mmol/L    Anion Gap 9 (L) 10 - 20 mmol/L    Urea Nitrogen 36 (H) 6 - 23 mg/dL    Creatinine 1.63 (H) 0.50 - 1.30 mg/dL    eGFR 39 (L) >60 mL/min/1.73m*2    Calcium 8.7 8.6 - 10.3 mg/dL                  Assessment/Plan   Principal Problem:    Acute pneumonia  Active Problems:    Shortness of breath     89 yo man w/ h/o COPD admitted w/ acute hypoxic resp failure     Acute hypoxic resp failure 2/2 PE - On 3L.  Wean O2 as tolerated.  Cont  IPV and incentive spirometry.  Small effusions noted. Will need home O2 eval on discharge     Possible COPD exacerbation - cont prednisone.  Recommend 5 day total course  Cont nebs     PE, DVT - Likely due to immobility and would need indefinite anticoagulation if can tolerate.  On eliqubari Gupta MD       No adenopathy or splenomegaly. No cervical or inguinal lymphadenopathy.

## 2023-12-19 NOTE — CARE PLAN
The patient's goals for the shift include  to not require more than 2L O2.     The clinical goals for the shift include patient will maintain SpO2 92% or ggreater throughout shift

## 2023-12-19 NOTE — PROGRESS NOTES
"Terrell Du is a 89 y.o. male on day 4 of admission presenting with Acute pneumonia.    Subjective   Feels better today       Objective     Physical Exam  Vitals reviewed.   Constitutional:       Appearance: He is ill-appearing.   HENT:      Head: Normocephalic and atraumatic.   Eyes:      Extraocular Movements: Extraocular movements intact.   Cardiovascular:      Rate and Rhythm: Normal rate and regular rhythm.      Heart sounds: Normal heart sounds.   Pulmonary:      Effort: Pulmonary effort is normal.      Breath sounds: Decreased breath sounds present.   Abdominal:      Palpations: Abdomen is soft.      Tenderness: There is no abdominal tenderness.   Musculoskeletal:      Cervical back: Normal range of motion.   Skin:     General: Skin is warm.   Neurological:      General: No focal deficit present.      Mental Status: He is alert and oriented to person, place, and time. Mental status is at baseline.   Psychiatric:         Mood and Affect: Mood normal.         Behavior: Behavior normal.         Last Recorded Vitals  Blood pressure 108/61, pulse 70, temperature 36.9 °C (98.4 °F), temperature source Temporal, resp. rate 19, height 1.778 m (5' 10\"), weight 67.3 kg (148 lb 4.8 oz), SpO2 90 %.  Intake/Output last 3 Shifts:  I/O last 3 completed shifts:  In: 1430.7 (21.3 mL/kg) [P.O.:720; I.V.:510.7 (7.6 mL/kg); IV Piggyback:200]  Out: 2425 (36.1 mL/kg) [Urine:2425 (1 mL/kg/hr)]  Weight: 67.3 kg     Relevant Results                Scheduled medications  amLODIPine, 10 mg, oral, Daily  ammonium lactate, , Topical, BID  apixaban, 10 mg, oral, BID   Followed by  [START ON 12/25/2023] apixaban, 5 mg, oral, BID  docusate sodium, 100 mg, oral, BID  [Held by provider] furosemide, 40 mg, intravenous, Daily  ipratropium-albuteroL, 3 mL, nebulization, TID  pantoprazole, 40 mg, oral, Daily  perflutren lipid microspheres, 0.5-10 mL of dilution, intravenous, Once in imaging  perflutren lipid microspheres, 0.5-10 mL of " dilution, intravenous, Once in imaging  perflutren protein A microsphere, 0.5 mL, intravenous, Once in imaging  perflutren protein A microsphere, 0.5 mL, intravenous, Once in imaging  piperacillin-tazobactam, 3.375 g, intravenous, TID  polyethylene glycol, 17 g, oral, Daily  predniSONE, 40 mg, oral, Daily  rOPINIRole, 3 mg, oral, Daily  sulfur hexafluoride microsphr, 2 mL, intravenous, Once in imaging  sulfur hexafluoride microsphr, 2 mL, intravenous, Once in imaging      Continuous medications       PRN medications  PRN medications: acetaminophen, albuterol, guaiFENesin, heparin, oxygen    Results for orders placed or performed during the hospital encounter of 12/14/23 (from the past 24 hour(s))   CBC and Auto Differential   Result Value Ref Range    WBC 7.3 4.4 - 11.3 x10*3/uL    nRBC 0.0 0.0 - 0.0 /100 WBCs    RBC 4.36 (L) 4.50 - 5.90 x10*6/uL    Hemoglobin 11.6 (L) 13.5 - 17.5 g/dL    Hematocrit 39.3 (L) 41.0 - 52.0 %    MCV 90 80 - 100 fL    MCH 26.6 26.0 - 34.0 pg    MCHC 29.5 (L) 32.0 - 36.0 g/dL    RDW 14.6 (H) 11.5 - 14.5 %    Platelets 99 (L) 150 - 450 x10*3/uL    Neutrophils % 51.5 40.0 - 80.0 %    Immature Granulocytes %, Automated 0.4 0.0 - 0.9 %    Lymphocytes % 40.7 13.0 - 44.0 %    Monocytes % 7.3 2.0 - 10.0 %    Eosinophils % 0.0 0.0 - 6.0 %    Basophils % 0.1 0.0 - 2.0 %    Neutrophils Absolute 3.75 1.60 - 5.50 x10*3/uL    Immature Granulocytes Absolute, Automated 0.03 0.00 - 0.50 x10*3/uL    Lymphocytes Absolute 2.96 0.80 - 3.00 x10*3/uL    Monocytes Absolute 0.53 0.05 - 0.80 x10*3/uL    Eosinophils Absolute 0.00 0.00 - 0.40 x10*3/uL    Basophils Absolute 0.01 0.00 - 0.10 x10*3/uL   B-type natriuretic peptide   Result Value Ref Range     (H) 0 - 99 pg/mL   Heparin Assay   Result Value Ref Range    Heparin Unfractionated 0.3 See Comment Below for Therapeutic Ranges IU/mL                  Assessment/Plan   Principal Problem:    Acute pneumonia  Active Problems:    Shortness of breath     89  yo man w/ h/o COPD admitted w/ acute hypoxic resp failure     Acute hypoxic resp failure 2/2 PE - On 2L.  Wean O2 as tolerated.  Cont  IPV and incentive spirometry.  Small effusions noted.  Diurese if has resp decompensation again.   follow-up  procal     Possible COPD exacerbation - cont prednisone.  Cont nebs     PE, DVT - Likely due to immobility and would need indefinite anticoagulation if can tolerate.  On joseph Gupta MD

## 2023-12-22 PROBLEM — J96.90 RESPIRATORY FAILURE (MULTI): Status: ACTIVE | Noted: 2023-01-01

## 2023-12-22 PROBLEM — J44.1 CHRONIC OBSTRUCTIVE PULMONARY DISEASE WITH ACUTE EXACERBATION (MULTI): Status: ACTIVE | Noted: 2023-01-01

## 2023-12-22 NOTE — PROGRESS NOTES
"Resident seen 2022 -- MP    CC: LTC ([facility]) Recheck    : 3/6/1928  NKDA  Full Code  Admit H&P done 10/11/2021    S: 89 yo man with chronic stable CHF, HTN, GERD, COPD, diffuse OA, RLS, and Hx Skin Melanoma. Progressive cough, dyspnea and chest pain over past 24 hours. No improvement with routine duonebs. Med List & Problem List reviewed.    O: VSS AFEB Wt 155# (down 4#) Awake, alert, mild respiratory distress with retractions. Chest wheeze and rhonchi. Heart rrr (PPM) 2/6 LUCAS. No c/c/e. 4+/5 MS upper extremities. 3-/5 LE bilaterally.    LAB (10/17/23) Cr 1.36, GFR 48, K 4.7, Na 143, Alb 3.5, TSH 2.36,   (10/9/23) Hgb 13->11.9, Plt 84  (23)Iron 296->51, %Sat 22.3, Ferritin 103 (23) B12 543    COGNITIVE:  SLUMS  ->   MOCA  ->     CXR (23) mild CHF    A/P:  # COPD w exacerbation and progressive respiratory failure: increasing o2 requirement over past 24 hours. CXR pending. Zpak and Prednisone ordered but with subsequent desaturation to 50% requiring 10 L o2. Patient is Full Code and 911 called for transport to ER for COPD exac vs Pna vs COVID with progressive respiratory failure.  # HTN: stable on Norvasc 10 mg. Off ARB d/t CKD4.  # FEN/Hx PCM: Weight stable. Prostat supplement. 2000 CC FR still listed under \"diet\" but per nursing this has been discontinued.  # Muscle Weakness and OA: no longer able to safely walk with walker. See mobility eval 2022.  # CHF: Stable weight OFF diuretics. OFF FR, OFF 2gm Na. F/U with cardiology.  # Hx Recurrent UTI/Prostatitis 2022: resolved.  # GERD: PPI  # RLS: stable on Requip 3 mg at hs.  # OA: fish oil per patient request, routine Tylenol 500 2 tid.  # MCI: MOCA/SLUMS improved from  to  with course of ST. Failed trial of return to home 2021. LTC since.  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  # Hx Anemia: resolved. Normal ferritin.  # hx Right chest melanoma IIC -- excised . Initial PET-CT negative per derm, repeat " PET scheduled 9/12/22 normal. Saw Oncololgy PAC Jack -- Unable to repeat PET due to generalized weakness. Derm appt scheduled 8/11/23 for removal of New black palpable nodule left chest wall. Recommends nephro consult for new CKD4, and prior to next OV 10/25/23 needs repeat CBC, CMP, iron panel (Ferritin, Iron/TIBC), B12, and flow cytometry.

## 2023-12-28 NOTE — LETTER
Patient: Terrell Du  : 3/6/1928    Encounter Date: 2023    Terrell Du is a 89 y.o. male with Chief Complaint of SNF ([facility]) H&P    Resident seen 2022 -- MP    CC: SNF ([facility]) Re-admit H&P    : 3/6/1928  NKDA  Full Code  Admit H&P done 10/11/2021, 23    S: 89 yo man with chronic stable CHF, HTN, GERD, COPD, diffuse OA, RLS, and Hx Skin Melanoma readmitted to SNF rehab after hospitalization -23 for HCAP. Med List & Problem List reviewed.    O: VSS AFEB Wt 152# (down 3#) Awake, alert, NAD on 4 Lpm O2. Chest cta. Heart rrr (PPM) 2/6 LUCAS. No c/c/e. 4+/5 MS upper extremities. 3-/5 LE bilaterally.    LAB (10/17/23) Cr 1.36, GFR 48, K 4.7, Na 143, Alb 3.5, TSH 2.36,   (10/9/23) Hgb 13->11.9, Plt 84  (23)Iron 296->51, %Sat 22.3, Ferritin 103 (23) B12 543    COGNITIVE:  SLUMS  ->   MOCA  ->     CXR (23) mild CHF    A/P:  # Respiratory failure following HCAP: 4 lpm O2. Tessalon. Completed Abx.  # COPD: Combivent QID.  # HTN: stable on Norvasc 10 mg. Off ARB d/t CKD4.  # FEN/Hx PCM: Weight stable.  # Muscle Weakness and OA: no longer able to safely walk with walker. See mobility eval 2022. SNF PT/OT.  # CHF: Lasix 40 mg daily. OFF FR, OFF 2gm Na. F/U with cardiology.  # Hx Recurrent UTI/Prostatitis 2022: resolved.  # GERD: PPI  # RLS: stable on Requip 3 mg at hs.  # OA: fish oil per patient request, routine Tylenol 500 2 tid.  # MCI: MOCA/SLUMS improved from  to  with course of ST. Failed trial of return to home 2021. LTC since.  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  # Hx Anemia: resolved. Normal ferritin.  # hx Right chest melanoma IIC -- excised . Initial PET-CT negative per derm, repeat PET scheduled 22 normal. Saw Oncololgy EUGENIO Santiago -- Unable to repeat PET due to generalized weakness. Derm appt scheduled 23 for removal of New black palpable nodule left chest wall. Recommends nephro consult for new  CKD4, and prior to next OV 10/25/23 needs repeat CBC, CMP, iron panel (Ferritin, Iron/TIBC), B12, and flow cytometry.  Past Surgical History:   Procedure Laterality Date   • OTHER SURGICAL HISTORY  07/31/2019    Tonsillectomy with adenoidectomy   • OTHER SURGICAL HISTORY  07/31/2019    Carotid thromboendarterectomy   • OTHER SURGICAL HISTORY  07/31/2019    Shoulder surgery   • OTHER SURGICAL HISTORY  07/31/2019    Cataract surgery   • OTHER SURGICAL HISTORY  07/31/2019    Pacemaker insertion   • OTHER SURGICAL HISTORY  07/31/2019    Aortic valve replacement   • OTHER SURGICAL HISTORY  07/31/2019    Hip replacement      Social History     Socioeconomic History   • Marital status: Single     Spouse name: Not on file   • Number of children: Not on file   • Years of education: Not on file   • Highest education level: Not on file   Occupational History   • Not on file   Tobacco Use   • Smoking status: Never   • Smokeless tobacco: Never   Substance and Sexual Activity   • Alcohol use: Not on file   • Drug use: Not on file   • Sexual activity: Not on file   Other Topics Concern   • Not on file   Social History Narrative   • Not on file     Social Determinants of Health     Financial Resource Strain: Low Risk  (12/14/2023)    Overall Financial Resource Strain (CARDIA)    • Difficulty of Paying Living Expenses: Not hard at all   Food Insecurity: Not on file   Transportation Needs: No Transportation Needs (12/14/2023)    PRAPARE - Transportation    • Lack of Transportation (Medical): No    • Lack of Transportation (Non-Medical): No   Physical Activity: Not on file   Stress: Not on file   Social Connections: Not on file   Intimate Partner Violence: Not on file   Housing Stability: Low Risk  (12/14/2023)    Housing Stability Vital Sign    • Unable to Pay for Housing in the Last Year: No    • Number of Places Lived in the Last Year: 1    • Unstable Housing in the Last Year: No     Past Medical History:   Diagnosis Date   •  Essential (primary) hypertension 07/31/2019    Benign essential HTN   • Personal history of other diseases of the circulatory system 07/31/2019    History of carotid artery stenosis   • Personal history of other diseases of the circulatory system 07/31/2019    History of congestive heart failure   • Personal history of other diseases of the nervous system and sense organs 07/31/2019    History of Bell's palsy   • Personal history of other diseases of the respiratory system 07/31/2019    History of chronic obstructive lung disease   • Personal history of other endocrine, nutritional and metabolic disease 07/31/2019    History of hyperlipidemia   • Personal history of other endocrine, nutritional and metabolic disease 07/31/2019    History of type 2 diabetes mellitus      No family history on file.     Review of Systems   Constitutional:  Negative for chills, fatigue and fever.   HENT:  Negative for rhinorrhea and sore throat.    Eyes:  Negative for pain and redness.   Respiratory:  Positive for shortness of breath. Negative for cough.    Cardiovascular:  Negative for chest pain and palpitations.   Gastrointestinal:  Negative for abdominal pain and blood in stool.   Endocrine: Negative for polydipsia and polyuria.   Genitourinary:  Negative for dysuria and hematuria.   Musculoskeletal:  Negative for back pain and neck stiffness.   Skin:  Negative for rash and wound.   Allergic/Immunologic: Negative for environmental allergies and food allergies.   Neurological:  Positive for weakness. Negative for headaches.   Hematological:  Negative for adenopathy. Does not bruise/bleed easily.   Psychiatric/Behavioral:  Negative for hallucinations and suicidal ideas.       There were no vitals taken for this visit.  Physical Exam  Vitals reviewed.   Constitutional:       General: He is not in acute distress.     Appearance: He is not ill-appearing.   HENT:      Head: Normocephalic and atraumatic.      Right Ear: Tympanic membrane  "normal.      Left Ear: Tympanic membrane normal.      Nose: No congestion or rhinorrhea.      Mouth/Throat:      Pharynx: No oropharyngeal exudate or posterior oropharyngeal erythema.   Eyes:      Extraocular Movements: Extraocular movements intact.      Conjunctiva/sclera: Conjunctivae normal.      Pupils: Pupils are equal, round, and reactive to light.   Cardiovascular:      Rate and Rhythm: Normal rate and regular rhythm.      Heart sounds: Murmur heard.      No friction rub. No gallop.      Comments: PPM  Pulmonary:      Effort: Pulmonary effort is normal.      Breath sounds: Normal breath sounds. No wheezing, rhonchi or rales.   Abdominal:      General: There is no distension.      Palpations: Abdomen is soft.      Tenderness: There is no abdominal tenderness. There is no guarding or rebound.   Musculoskeletal:         General: No swelling or deformity.      Cervical back: Normal range of motion and neck supple.      Right lower leg: No edema.      Left lower leg: No edema.   Skin:     Capillary Refill: Capillary refill takes less than 2 seconds.      Coloration: Skin is not jaundiced.      Findings: No rash.   Neurological:      General: No focal deficit present.      Mental Status: He is alert.      Motor: Weakness present.   Psychiatric:         Mood and Affect: Mood normal.         Behavior: Behavior normal.       Lab Results   Component Value Date    WBC 7.3 12/19/2023    HGB 11.6 (L) 12/19/2023    HCT 38.2 (L) 12/19/2023    MCV 89 12/19/2023    PLT 97 (L) 12/19/2023     No results found for: \"CHOL\"  No results found for: \"HDL\"  No results found for: \"LDLCALC\"  No results found for: \"TRIG\"  No components found for: \"CHOLHDL\"  Lab Results   Component Value Date    HGBA1C 6.8 (A) 10/06/2021       Assessment/Plan  Problem List Items Addressed This Visit       Chronic obstructive pulmonary disease with acute exacerbation (CMS/HCC)    Respiratory failure (CMS/HCC)    HCAP (healthcare-associated pneumonia) "       Electronically Signed By: Víctor Garcia MD   1/14/24  4:14 PM

## 2023-12-30 NOTE — PROGRESS NOTES
*Provider Impression*  Patient is a 89 year old male who is seen today for management of multiple medical problems  #COPD / PE - combivent 20/100mcg 1 puff 4x/day, mucinex ER 600mg BID, cetirizine 10mg daily, flonase 1 spray daily, duoneb q4h PRN, tessalon 100mg TID, prednisone 40mg daily until 12/25, eliquis 10mg BID until 12/25 then 5mg BID  #CHF / HLD / HTN / CKD - furosemide 40mg daily, amlodipine 10mg daily, fish oil 1000mg BID  #GERD - protonix 40mg daily  #RLS - ropinirole 3mg daily, acetaminophen 650mg q4h PRN  #ACP - DNRCC  Follow up as needed      *Chief Complaint*  respiratory failure     *History of Present Illness*  Pt is a 89 y/o male LTC resident of [facility] as below who is seen today for f/u and management of multiple medical problems.    He was sent to the ED with worsening shortness of breath and was admitted for acute on chronic hypoxic respiratory failure. He was seen by pulmonology for COPD exacerbation and new PE with steroids, antibiotics, and heparin drip which was transitioned to eliquis. Patient was also seen by cardiology for heart failure exacerbation and treated with IV diuresis and then transitioned to higher lasix dose. He was stabilized for discharge on supplemental oxygen with recommendations for sleep study to be done as he did have desaturations while sleeping, and was d/c to [facility] [place].    He is seen sitting up in his room and reports he is still SOB, but better, no CP, f/c, sweats, cough, n/v, constipation, diarrhea, no blood in stool, LUTS, or any other new c/o presently.     Allergies - NKA  PMH - HTN, carotid artery stenosis, COPD, CHF, HLD, T2DM, melanoma  PSH - AV replacement, carotid thromboendarterectomy, cataract surgery, hip replacement, pacemaker, shoulder surgery, tonsillectomy  FH - father had CAD  SocHx - Former smoker, Former EtOH      *Review of Systems*  All other systems reviewed are negative except as noted in the HPI     *Vitals*  Date: 12/21/23 -  T: 97.5 P: 70 R: 16 BP: 125/69 SpO2: 97% on O2      *Results/Data*  CBC - Date: 12/19/23 WBC: 7.3 HGB: 11.6 HCT: 38.2 PLT: 97 ;   BMP - Date: 12/19/23 Na: 141 K: 4.2 Cl: 97 CO2: 39 BUN: 36 Cr: 1.63 Glu: 171 Ca: 8.7 ;   LFT - Date: 11/22/23 AST: 14 ALT: 7 ALP: 68 Tbili: 0.6 ;   Coags - Date: INR: PT:   Other - Date: 5/26/23 - Iron: 70  TIBC: 295  B12: 543    *Physical Exam*  Gen: (+) NAD, (+) well-appearing  HEENT: (+) normocephalic, (+) MMM  Neck: (+) supple  Lungs: (+) CTAB, (-) wheezes, (-) rales, (-) rhonchi  Heart: (+) RRR, (+) S1 S2, (-) murmurs  Pulses: (+) palpable  Abd: (+) soft, (+) NT, (+) ND, (+) BS+  Ext: (-) edema, (-) deformity  MSK: (-) joint swelling  Skin: (+) warm, (+) dry, (-) rash  Neuro: (+) follows commands, (-) tremor, (+) alert

## 2024-01-01 ENCOUNTER — NURSING HOME VISIT (OUTPATIENT)
Dept: POST ACUTE CARE | Facility: EXTERNAL LOCATION | Age: 89
End: 2024-01-01
Payer: COMMERCIAL

## 2024-01-01 DIAGNOSIS — I10 BENIGN HYPERTENSION: ICD-10-CM

## 2024-01-01 DIAGNOSIS — J44.1 CHRONIC OBSTRUCTIVE PULMONARY DISEASE WITH ACUTE EXACERBATION (MULTI): ICD-10-CM

## 2024-01-01 DIAGNOSIS — I26.99 ACUTE PULMONARY EMBOLISM, UNSPECIFIED PULMONARY EMBOLISM TYPE, UNSPECIFIED WHETHER ACUTE COR PULMONALE PRESENT (MULTI): ICD-10-CM

## 2024-01-01 DIAGNOSIS — N18.4 CKD (CHRONIC KIDNEY DISEASE) STAGE 4, GFR 15-29 ML/MIN (MULTI): ICD-10-CM

## 2024-01-01 DIAGNOSIS — J44.1 CHRONIC OBSTRUCTIVE PULMONARY DISEASE WITH ACUTE EXACERBATION (MULTI): Primary | ICD-10-CM

## 2024-01-01 DIAGNOSIS — K21.9 GASTROESOPHAGEAL REFLUX DISEASE, UNSPECIFIED WHETHER ESOPHAGITIS PRESENT: ICD-10-CM

## 2024-01-01 DIAGNOSIS — I50.9 CONGESTIVE HEART FAILURE, UNSPECIFIED HF CHRONICITY, UNSPECIFIED HEART FAILURE TYPE (MULTI): ICD-10-CM

## 2024-01-01 DIAGNOSIS — F41.9 ANXIETY: ICD-10-CM

## 2024-01-01 DIAGNOSIS — G25.81 RLS (RESTLESS LEGS SYNDROME): ICD-10-CM

## 2024-01-01 LAB
ATRIAL RATE: 78 BPM
P AXIS: 37 DEGREES
PR INTERVAL: 248 MS
Q ONSET: 205 MS
QRS COUNT: 13 BEATS
QRS DURATION: 156 MS
QT INTERVAL: 452 MS
QTC CALCULATION(BAZETT): 515 MS
QTC FREDERICIA: 493 MS
R AXIS: 56 DEGREES
T AXIS: 63 DEGREES
T OFFSET: 431 MS
VENTRICULAR RATE: 78 BPM

## 2024-01-01 PROCEDURE — 99309 SBSQ NF CARE MODERATE MDM 30: CPT | Performed by: NURSE PRACTITIONER

## 2024-01-01 PROCEDURE — 99309 SBSQ NF CARE MODERATE MDM 30: CPT | Performed by: FAMILY MEDICINE

## 2024-01-01 ASSESSMENT — ENCOUNTER SYMPTOMS
WOUND: 0
ADENOPATHY: 0
WEAKNESS: 1
RHINORRHEA: 0
BLOOD IN STOOL: 0
FATIGUE: 0
SORE THROAT: 0
EYE REDNESS: 0
SHORTNESS OF BREATH: 1
POLYDIPSIA: 0
BACK PAIN: 0
COUGH: 0
FEVER: 0
CHILLS: 0
ABDOMINAL PAIN: 0
BRUISES/BLEEDS EASILY: 0
NECK STIFFNESS: 0
HEADACHES: 0
HALLUCINATIONS: 0
PALPITATIONS: 0
DYSURIA: 0
EYE PAIN: 0
HEMATURIA: 0

## 2024-01-04 PROBLEM — J18.9 HCAP (HEALTHCARE-ASSOCIATED PNEUMONIA): Status: ACTIVE | Noted: 2024-01-01

## 2024-01-04 NOTE — PROGRESS NOTES
Terrell Du is a 89 y.o. male with Chief Complaint of SNF ([facility]) H&P    Resident seen 2022 -- MP    CC: SNF ([facility]) Re-admit H&P    : 3/6/1928  NKDA  Full Code  Admit H&P done 10/11/2021, 23    S: 89 yo man with chronic stable CHF, HTN, GERD, COPD, diffuse OA, RLS, and Hx Skin Melanoma readmitted to SNF rehab after hospitalization -23 for HCAP. Med List & Problem List reviewed.    O: VSS AFEB Wt 152# (down 3#) Awake, alert, NAD on 4 Lpm O2. Chest cta. Heart rrr (PPM) 2/6 LUCAS. No c/c/e. 4+/5 MS upper extremities. 3-/5 LE bilaterally.    LAB (10/17/23) Cr 1.36, GFR 48, K 4.7, Na 143, Alb 3.5, TSH 2.36,   (10/9/23) Hgb 13->11.9, Plt 84  (23)Iron 296->51, %Sat 22.3, Ferritin 103 (23) B12 543    COGNITIVE:  SLUMS  ->   MOCA  ->     CXR (23) mild CHF    A/P:  # Respiratory failure following HCAP: 4 lpm O2. Tessalon. Completed Abx.  # COPD: Combivent QID.  # HTN: stable on Norvasc 10 mg. Off ARB d/t CKD4.  # FEN/Hx PCM: Weight stable.  # Muscle Weakness and OA: no longer able to safely walk with walker. See mobility eval 2022. SNF PT/OT.  # CHF: Lasix 40 mg daily. OFF FR, OFF 2gm Na. F/U with cardiology.  # Hx Recurrent UTI/Prostatitis 2022: resolved.  # GERD: PPI  # RLS: stable on Requip 3 mg at hs.  # OA: fish oil per patient request, routine Tylenol 500 2 tid.  # MCI: MOCA/SLUMS improved from  to  with course of ST. Failed trial of return to home 2021. LTC since.  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  # Hx Anemia: resolved. Normal ferritin.  # hx Right chest melanoma IIC -- excised . Initial PET-CT negative per derm, repeat PET scheduled 22 normal. Saw Oncololgy EUGENIO Santiago -- Unable to repeat PET due to generalized weakness. Derm appt scheduled 23 for removal of New black palpable nodule left chest wall. Recommends nephro consult for new CKD4, and prior to next OV 10/25/23 needs repeat CBC, CMP, iron panel  (Ferritin, Iron/TIBC), B12, and flow cytometry.  Past Surgical History:   Procedure Laterality Date    OTHER SURGICAL HISTORY  07/31/2019    Tonsillectomy with adenoidectomy    OTHER SURGICAL HISTORY  07/31/2019    Carotid thromboendarterectomy    OTHER SURGICAL HISTORY  07/31/2019    Shoulder surgery    OTHER SURGICAL HISTORY  07/31/2019    Cataract surgery    OTHER SURGICAL HISTORY  07/31/2019    Pacemaker insertion    OTHER SURGICAL HISTORY  07/31/2019    Aortic valve replacement    OTHER SURGICAL HISTORY  07/31/2019    Hip replacement      Social History     Socioeconomic History    Marital status: Single     Spouse name: Not on file    Number of children: Not on file    Years of education: Not on file    Highest education level: Not on file   Occupational History    Not on file   Tobacco Use    Smoking status: Never    Smokeless tobacco: Never   Substance and Sexual Activity    Alcohol use: Not on file    Drug use: Not on file    Sexual activity: Not on file   Other Topics Concern    Not on file   Social History Narrative    Not on file     Social Determinants of Health     Financial Resource Strain: Low Risk  (12/14/2023)    Overall Financial Resource Strain (CARDIA)     Difficulty of Paying Living Expenses: Not hard at all   Food Insecurity: Not on file   Transportation Needs: No Transportation Needs (12/14/2023)    PRAPARE - Transportation     Lack of Transportation (Medical): No     Lack of Transportation (Non-Medical): No   Physical Activity: Not on file   Stress: Not on file   Social Connections: Not on file   Intimate Partner Violence: Not on file   Housing Stability: Low Risk  (12/14/2023)    Housing Stability Vital Sign     Unable to Pay for Housing in the Last Year: No     Number of Places Lived in the Last Year: 1     Unstable Housing in the Last Year: No     Past Medical History:   Diagnosis Date    Essential (primary) hypertension 07/31/2019    Benign essential HTN    Personal history of other  diseases of the circulatory system 07/31/2019    History of carotid artery stenosis    Personal history of other diseases of the circulatory system 07/31/2019    History of congestive heart failure    Personal history of other diseases of the nervous system and sense organs 07/31/2019    History of Bell's palsy    Personal history of other diseases of the respiratory system 07/31/2019    History of chronic obstructive lung disease    Personal history of other endocrine, nutritional and metabolic disease 07/31/2019    History of hyperlipidemia    Personal history of other endocrine, nutritional and metabolic disease 07/31/2019    History of type 2 diabetes mellitus      No family history on file.     Review of Systems   Constitutional:  Negative for chills, fatigue and fever.   HENT:  Negative for rhinorrhea and sore throat.    Eyes:  Negative for pain and redness.   Respiratory:  Positive for shortness of breath. Negative for cough.    Cardiovascular:  Negative for chest pain and palpitations.   Gastrointestinal:  Negative for abdominal pain and blood in stool.   Endocrine: Negative for polydipsia and polyuria.   Genitourinary:  Negative for dysuria and hematuria.   Musculoskeletal:  Negative for back pain and neck stiffness.   Skin:  Negative for rash and wound.   Allergic/Immunologic: Negative for environmental allergies and food allergies.   Neurological:  Positive for weakness. Negative for headaches.   Hematological:  Negative for adenopathy. Does not bruise/bleed easily.   Psychiatric/Behavioral:  Negative for hallucinations and suicidal ideas.       There were no vitals taken for this visit.  Physical Exam  Vitals reviewed.   Constitutional:       General: He is not in acute distress.     Appearance: He is not ill-appearing.   HENT:      Head: Normocephalic and atraumatic.      Right Ear: Tympanic membrane normal.      Left Ear: Tympanic membrane normal.      Nose: No congestion or rhinorrhea.       "Mouth/Throat:      Pharynx: No oropharyngeal exudate or posterior oropharyngeal erythema.   Eyes:      Extraocular Movements: Extraocular movements intact.      Conjunctiva/sclera: Conjunctivae normal.      Pupils: Pupils are equal, round, and reactive to light.   Cardiovascular:      Rate and Rhythm: Normal rate and regular rhythm.      Heart sounds: Murmur heard.      No friction rub. No gallop.      Comments: PPM  Pulmonary:      Effort: Pulmonary effort is normal.      Breath sounds: Normal breath sounds. No wheezing, rhonchi or rales.   Abdominal:      General: There is no distension.      Palpations: Abdomen is soft.      Tenderness: There is no abdominal tenderness. There is no guarding or rebound.   Musculoskeletal:         General: No swelling or deformity.      Cervical back: Normal range of motion and neck supple.      Right lower leg: No edema.      Left lower leg: No edema.   Skin:     Capillary Refill: Capillary refill takes less than 2 seconds.      Coloration: Skin is not jaundiced.      Findings: No rash.   Neurological:      General: No focal deficit present.      Mental Status: He is alert.      Motor: Weakness present.   Psychiatric:         Mood and Affect: Mood normal.         Behavior: Behavior normal.       Lab Results   Component Value Date    WBC 7.3 12/19/2023    HGB 11.6 (L) 12/19/2023    HCT 38.2 (L) 12/19/2023    MCV 89 12/19/2023    PLT 97 (L) 12/19/2023     No results found for: \"CHOL\"  No results found for: \"HDL\"  No results found for: \"LDLCALC\"  No results found for: \"TRIG\"  No components found for: \"CHOLHDL\"  Lab Results   Component Value Date    HGBA1C 6.8 (A) 10/06/2021       Assessment/Plan   Problem List Items Addressed This Visit       Chronic obstructive pulmonary disease with acute exacerbation (CMS/HCC)    Respiratory failure (CMS/HCC)    HCAP (healthcare-associated pneumonia)       "

## 2024-01-04 NOTE — LETTER
Patient: Terrell Du  : 3/6/1928    Encounter Date: 2024    Resident seen 2024 -- MP    CC: SNF ([facility] Re-admit H&P    : 3/6/1928  NKDA  Full Code  Admit H&P done 10/11/2021, 23    S: 89 yo man with chronic stable CHF, HTN, GERD, COPD, diffuse OA, RLS, and Hx Skin Melanoma readmitted to SNF rehab after hospitalization -23 for HCAP. Had stabilized but this am with acute respiratory distress, 77% on 4 Lpm requiring prn breathing treatment and 6lpm to stabilize. Med List & Problem List reviewed.    O: VSS AFEB Wt 146# (down 6#) Awake, alert, NAD,back to 93% on 4 Lpm O2. Chest wheeze/rhonchi. Heart rrr (PPM) 2/6 LUCAS. No c/c/e. 4+/5 MS upper extremities. 3-/5 LE bilaterally.    LAB (10/17/23) Cr 1.36, GFR 48, K 4.7, Na 143, Alb 3.5, TSH 2.36,   (10/9/23) Hgb 13->11.9, Plt 84  (23)Iron 296->51, %Sat 22.3, Ferritin 103 (23) B12 543    COGNITIVE:  SLUMS  -> 24  MOCA  -> 24    CXR (23) mild CHF    A/P:  # COPD w acute exacerbation: Treat with zpak and prednisone burst and taper. Combivent QID.  # Respiratory failure following HCAP: 4 lpm O2. Tessalon. Completed Abx.  # HTN: stable on Norvasc 10 mg. Off ARB d/t CKD4.  # FEN/Hx PCM: Weight stable.  # Muscle Weakness and OA: no longer able to safely walk with walker. See mobility eval 2022. SNF PT/OT.  # CHF: Lasix 40 mg daily. OFF FR, OFF 2gm Na. F/U with cardiology.  # Hx Recurrent UTI/Prostatitis 2022: resolved.  # GERD: PPI  # RLS: stable on Requip 3 mg at hs.  # OA: fish oil per patient request, routine Tylenol 500 2 tid.  # MCI: MOCA/SLUMS improved from  to  with course of ST. Failed trial of return to home 2021. LTC since.  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  # Hx Anemia: resolved. Normal ferritin.  # hx Right chest melanoma IIC -- excised . Initial PET-CT negative per derm, repeat PET scheduled 22 normal. Saw Oncololgy PAC Jack -- Unable to repeat PET due to  generalized weakness. Derm appt scheduled 8/11/23 for removal of New black palpable nodule left chest wall. Recommends nephro consult for new CKD4, and prior to next OV 10/25/23 needs repeat CBC, CMP, iron panel (Ferritin, Iron/TIBC), B12, and flow cytometry.      Electronically Signed By: Víctor Garcia MD   1/14/24  4:14 PM

## 2024-01-08 NOTE — LETTER
Patient: Terrell Du  : 3/6/1928    Encounter Date: 2024    Resident seen 2024 -- MP    CC: SNF ([facility]) Re-check    : 3/6/1928  NKDA  Full Code  Admit H&P done 10/11/2021, 23    S: 89 yo man with chronic stable CHF, HTN, GERD, COPD, diffuse OA, RLS, and Hx Skin Melanoma. Poor response to prednisone and zpak for copd exacerbation 24. Cough/wheeze/O2 requirement up to 4 lpm this am. Med List & Problem List reviewed.    O: VSS AFEB Wt 150# (up 4#) Awake, alert, NAD,back to 93% on 4 Lpm O2. Chest wheeze/rhonchi. Heart rrr (PPM) 2/6 LUCAS. No c/c/e. 4+/5 MS upper extremities. 3-/5 LE bilaterally.    LAB (10/17/23) Cr 1.36, GFR 48, K 4.7, Na 143, Alb 3.5, TSH 2.36,   (10/9/23) Hgb 13->11.9, Plt 84  (23)Iron 296->51, %Sat 22.3, Ferritin 103 (23) B12 543    COGNITIVE:  SLUMS  ->   MOCA  ->     CXR (23) mild CHF    A/P:  # COPD w acute exacerbation: complete zpak and increase intensity of prednisone burst and taper. Combivent QID.  # Respiratory failure following HCAP: 4 lpm O2. Tessalon.  # HTN: stable on Norvasc 10 mg. Off ARB d/t CKD4.  # FEN/Hx PCM: Weight stable.  # Muscle Weakness and OA: no longer able to safely walk with walker. See mobility eval 2022. SNF PT/OT.  # CHF: Lasix 40 mg daily. OFF FR, OFF 2gm Na. F/U with cardiology.  # Hx Recurrent UTI/Prostatitis 2022: resolved.  # GERD: PPI  # RLS: stable on Requip 3 mg at hs.  # OA: fish oil per patient request, routine Tylenol 500 2 tid.  # MCI: MOCA/SLUMS improved from  to  with course of ST. Failed trial of return to home 2021. LTC since.  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  # Hx Anemia: resolved. Normal ferritin.  # hx Right chest melanoma IIC -- excised . Initial PET-CT negative per derm, repeat PET scheduled 22 normal. Saw Oncololgy PAC Jack -- Unable to repeat PET due to generalized weakness. Derm appt scheduled 23 for removal of New black palpable  nodule left chest wall. Recommends nephro consult for new CKD4, and prior to next OV 10/25/23 needs repeat CBC, CMP, iron panel (Ferritin, Iron/TIBC), B12, and flow cytometry.      Electronically Signed By: Víctor Garcia MD   1/14/24  4:15 PM

## 2024-01-08 NOTE — PROGRESS NOTES
Resident seen 2024 -- MP    CC: SNF ([facility]) Re-check    : 3/6/1928  NKDA  Full Code  Admit H&P done 10/11/2021, 23    S: 89 yo man with chronic stable CHF, HTN, GERD, COPD, diffuse OA, RLS, and Hx Skin Melanoma. Poor response to prednisone and zpak for copd exacerbation 24. Cough/wheeze/O2 requirement up to 4 lpm this am. Med List & Problem List reviewed.    O: VSS AFEB Wt 150# (up 4#) Awake, alert, NAD,back to 93% on 4 Lpm O2. Chest wheeze/rhonchi. Heart rrr (PPM) 2/6 LUCAS. No c/c/e. 4+/5 MS upper extremities. 3-/5 LE bilaterally.    LAB (10/17/23) Cr 1.36, GFR 48, K 4.7, Na 143, Alb 3.5, TSH 2.36,   (10/9/23) Hgb 13->11.9, Plt 84  (23)Iron 296->51, %Sat 22.3, Ferritin 103 (23) B12 543    COGNITIVE:  SLUMS  ->   MOCA  ->     CXR (23) mild CHF    A/P:  # COPD w acute exacerbation: complete zpak and increase intensity of prednisone burst and taper. Combivent QID.  # Respiratory failure following HCAP: 4 lpm O2. Tessalon.  # HTN: stable on Norvasc 10 mg. Off ARB d/t CKD4.  # FEN/Hx PCM: Weight stable.  # Muscle Weakness and OA: no longer able to safely walk with walker. See mobility eval 2022. SNF PT/OT.  # CHF: Lasix 40 mg daily. OFF FR, OFF 2gm Na. F/U with cardiology.  # Hx Recurrent UTI/Prostatitis 2022: resolved.  # GERD: PPI  # RLS: stable on Requip 3 mg at hs.  # OA: fish oil per patient request, routine Tylenol 500 2 tid.  # MCI: MOCA/SLUMS improved from  to  with course of ST. Failed trial of return to home 2021. LTC since.  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  # Hx Anemia: resolved. Normal ferritin.  # hx Right chest melanoma IIC -- excised . Initial PET-CT negative per derm, repeat PET scheduled 22 normal. Saw Oncololgy PAC Jack -- Unable to repeat PET due to generalized weakness. Derm appt scheduled 23 for removal of New black palpable nodule left chest wall. Recommends nephro consult for new CKD4, and prior to next  OV 10/25/23 needs repeat CBC, CMP, iron panel (Ferritin, Iron/TIBC), B12, and flow cytometry.

## 2024-01-08 NOTE — PROGRESS NOTES
Resident seen 2024 -- MP    CC: SNF ([facility]) Re-admit H&P    : 3/6/1928  NKDA  Full Code  Admit H&P done 10/11/2021, 23    S: 89 yo man with chronic stable CHF, HTN, GERD, COPD, diffuse OA, RLS, and Hx Skin Melanoma readmitted to SNF rehab after hospitalization -23 for HCAP. Had stabilized but this am with acute respiratory distress, 77% on 4 Lpm requiring prn breathing treatment and 6lpm to stabilize. Med List & Problem List reviewed.    O: VSS AFEB Wt 146# (down 6#) Awake, alert, NAD,back to 93% on 4 Lpm O2. Chest wheeze/rhonchi. Heart rrr (PPM) 2/6 LUCAS. No c/c/e. 4+/5 MS upper extremities. 3-/5 LE bilaterally.    LAB (10/17/23) Cr 1.36, GFR 48, K 4.7, Na 143, Alb 3.5, TSH 2.36,   (10/9/23) Hgb 13->11.9, Plt 84  (23)Iron 296->51, %Sat 22.3, Ferritin 103 (23) B12 543    COGNITIVE:  SLUMS  ->   MOCA  ->     CXR (23) mild CHF    A/P:  # COPD w acute exacerbation: Treat with zpak and prednisone burst and taper. Combivent QID.  # Respiratory failure following HCAP: 4 lpm O2. Tessalon. Completed Abx.  # HTN: stable on Norvasc 10 mg. Off ARB d/t CKD4.  # FEN/Hx PCM: Weight stable.  # Muscle Weakness and OA: no longer able to safely walk with walker. See mobility eval 2022. SNF PT/OT.  # CHF: Lasix 40 mg daily. OFF FR, OFF 2gm Na. F/U with cardiology.  # Hx Recurrent UTI/Prostatitis 2022: resolved.  # GERD: PPI  # RLS: stable on Requip 3 mg at hs.  # OA: fish oil per patient request, routine Tylenol 500 2 tid.  # MCI: MOCA/SLUMS improved from  to  with course of ST. Failed trial of return to home 2021. LTC since.  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  # Hx Anemia: resolved. Normal ferritin.  # hx Right chest melanoma IIC -- excised . Initial PET-CT negative per derm, repeat PET scheduled 22 normal. Saw Oncololgy PAC Jack -- Unable to repeat PET due to generalized weakness. Derm appt scheduled 23 for removal of New black  palpable nodule left chest wall. Recommends nephro consult for new CKD4, and prior to next OV 10/25/23 needs repeat CBC, CMP, iron panel (Ferritin, Iron/TIBC), B12, and flow cytometry.

## 2024-01-16 NOTE — PROGRESS NOTES
*Provider Impression*  Patient is a 89 year old male who is seen today for management of multiple medical problems  #COPD / PE - combivent 20/100mcg 1 puff 4x/day, mucinex ER 600mg BID, cetirizine 10mg daily, flonase 1 spray daily, duoneb 4x/day and q4h PRN, tessalon 100mg TID, prednisone taper until 1/17, eliquis 5mg BID, consult hospice  #CHF / HLD / HTN / CKD - furosemide 40mg daily, amlodipine 10mg daily, fish oil 1000mg BID, add furosemide 40mg QPM x7d, add aldactone 25mg x1 now then daily, consult hospice (addendum: start morphine 5mg q4h PRN)  #Anxiety - (addendum: start lorazepam 0.5mg q4h PRN)  #GERD - protonix 40mg daily  #RLS - ropinirole 3mg daily, acetaminophen 650mg q4h PRN  #ACP - DNRCC  Follow up as needed      *Chief Complaint*  respiratory failure     *History of Present Illness*  Pt is a 89 y/o male LTC resident of [facility] as below who is seen today for f/u and management of multiple medical problems.    He was sent to the ED with worsening shortness of breath and was admitted for acute on chronic hypoxic respiratory failure. He was seen by pulmonology for COPD exacerbation and new PE with steroids, antibiotics, and heparin drip which was transitioned to eliquis. Patient was also seen by cardiology for heart failure exacerbation and treated with IV diuresis and then transitioned to higher lasix dose. He was stabilized for discharge on supplemental oxygen with recommendations for sleep study to be done as he did have desaturations while sleeping, and was d/c to [facility].    His duoneb made routine, prednisone taper started, CXR today shows pulmonary congestion and his O2 demands have increased.     He is seen sitting up in his room today and endorses some SOB, no f/c, sweats, coughing, CP, n/v, constipation, diarrhea, or any other new c/o presently.     Awaiting full set of vitals on the day of visit. We discuss plan of care. He indicates that he absolutely does not want to be sent  out to the hospital for any reason. He wants to pursue comfort care measures and consult with hospice.    (Addendum: 1/12 - nursing staff report that there was a discussion w/ [facility] who elected to initiate comfort care measures, but elected not to consult hospice. Ordered morphine and ativan; 1/15 - nursing staff requesting increase of the morphine - WARREN now electing to consult w/ [facility] who will be out to admit him and in the interim I increase his morphine to add 5mg TID to = 45MME per day and request that he be seen by PCP during rounds today - once hospice is on board they may increase his regimen)    Allergies - NKA  PMH - HTN, carotid artery stenosis, COPD, CHF, HLD, T2DM, melanoma  PSH - AV replacement, carotid thromboendarterectomy, cataract surgery, hip replacement, pacemaker, shoulder surgery, tonsillectomy  FH - father had CAD  SocHx - Former smoker, Former EtOH      *Review of Systems*  All other systems reviewed are negative except as noted in the HPI     *Vitals*  Date: 1/7/23 - T: 98.2 P: 70 R: 16 BP: 115/50 SpO2: 95% on O2      *Results/Data*  CBC - Date: 12/19/23 WBC: 7.3 HGB: 11.6 HCT: 38.2 PLT: 97 ;   BMP - Date: 12/19/23 Na: 141 K: 4.2 Cl: 97 CO2: 39 BUN: 36 Cr: 1.63 Glu: 171 Ca: 8.7 ;   LFT - Date: 11/22/23 AST: 14 ALT: 7 ALP: 68 Tbili: 0.6 ;   Coags - Date: INR: PT:   Other - Date: 5/26/23 - Iron: 70  TIBC: 295  B12: 543    *Physical Exam*  Gen: (+) NAD, (+) well-appearing  HEENT: (+) normocephalic, (+) MMM  Neck: (+) supple  Lungs: (+) CTAB, (-) wheezes, (-) rales, (-) rhonchi  Heart: (+) RRR, (+) S1 S2, (-) murmurs  Pulses: (+) palpable  Abd: (+) soft, (+) NT, (+) ND, (+) BS+  Ext: (-) edema, (-) deformity  MSK: (-) joint swelling  Skin: (+) warm, (+) dry, (-) rash  Neuro: (+) follows commands, (-) tremor, (+) alert

## 2024-03-04 ENCOUNTER — APPOINTMENT (OUTPATIENT)
Dept: DERMATOLOGY | Facility: CLINIC | Age: 89
End: 2024-03-04
Payer: COMMERCIAL

## 2024-06-12 ENCOUNTER — APPOINTMENT (OUTPATIENT)
Dept: NEPHROLOGY | Facility: CLINIC | Age: 89
End: 2024-06-12
Payer: COMMERCIAL